# Patient Record
Sex: FEMALE | Race: WHITE | NOT HISPANIC OR LATINO | URBAN - METROPOLITAN AREA
[De-identification: names, ages, dates, MRNs, and addresses within clinical notes are randomized per-mention and may not be internally consistent; named-entity substitution may affect disease eponyms.]

---

## 2018-05-21 ENCOUNTER — OUTPATIENT (OUTPATIENT)
Dept: OUTPATIENT SERVICES | Facility: HOSPITAL | Age: 63
LOS: 1 days | Discharge: HOME | End: 2018-05-21

## 2018-05-21 DIAGNOSIS — F25.1 SCHIZOAFFECTIVE DISORDER, DEPRESSIVE TYPE: ICD-10-CM

## 2018-12-04 ENCOUNTER — OUTPATIENT (OUTPATIENT)
Dept: OUTPATIENT SERVICES | Facility: HOSPITAL | Age: 63
LOS: 1 days | Discharge: HOME | End: 2018-12-04

## 2018-12-05 DIAGNOSIS — F20.9 SCHIZOPHRENIA, UNSPECIFIED: ICD-10-CM

## 2018-12-18 ENCOUNTER — OUTPATIENT (OUTPATIENT)
Dept: OUTPATIENT SERVICES | Facility: HOSPITAL | Age: 63
LOS: 1 days | Discharge: HOME | End: 2018-12-18

## 2018-12-18 DIAGNOSIS — F20.9 SCHIZOPHRENIA, UNSPECIFIED: ICD-10-CM

## 2020-10-02 ENCOUNTER — TRANSCRIPTION ENCOUNTER (OUTPATIENT)
Age: 65
End: 2020-10-02

## 2021-10-07 ENCOUNTER — INPATIENT (INPATIENT)
Facility: HOSPITAL | Age: 66
LOS: 6 days | Discharge: NURSING, REHAB OR LONG-TERM CARE | End: 2021-10-14
Attending: HOSPITALIST | Admitting: HOSPITALIST
Payer: MEDICARE

## 2021-10-07 VITALS
OXYGEN SATURATION: 97 % | HEART RATE: 92 BPM | RESPIRATION RATE: 18 BRPM | DIASTOLIC BLOOD PRESSURE: 72 MMHG | SYSTOLIC BLOOD PRESSURE: 103 MMHG | TEMPERATURE: 99 F

## 2021-10-07 LAB
ALBUMIN SERPL ELPH-MCNC: 3.9 G/DL — SIGNIFICANT CHANGE UP (ref 3.5–5.2)
ALP SERPL-CCNC: 53 U/L — SIGNIFICANT CHANGE UP (ref 30–115)
ALT FLD-CCNC: 14 U/L — SIGNIFICANT CHANGE UP (ref 0–41)
ANION GAP SERPL CALC-SCNC: 14 MMOL/L — SIGNIFICANT CHANGE UP (ref 7–14)
AST SERPL-CCNC: 23 U/L — SIGNIFICANT CHANGE UP (ref 0–41)
BASE EXCESS BLDV CALC-SCNC: 4.5 MMOL/L — HIGH (ref -2–3)
BASOPHILS # BLD AUTO: 0.03 K/UL — SIGNIFICANT CHANGE UP (ref 0–0.2)
BASOPHILS NFR BLD AUTO: 0.3 % — SIGNIFICANT CHANGE UP (ref 0–1)
BILIRUB SERPL-MCNC: 0.2 MG/DL — SIGNIFICANT CHANGE UP (ref 0.2–1.2)
BUN SERPL-MCNC: 20 MG/DL — SIGNIFICANT CHANGE UP (ref 10–20)
CA-I SERPL-SCNC: 1.07 MMOL/L — LOW (ref 1.15–1.33)
CALCIUM SERPL-MCNC: 9.2 MG/DL — SIGNIFICANT CHANGE UP (ref 8.5–10.1)
CHLORIDE SERPL-SCNC: 98 MMOL/L — SIGNIFICANT CHANGE UP (ref 98–110)
CO2 SERPL-SCNC: 29 MMOL/L — SIGNIFICANT CHANGE UP (ref 17–32)
CREAT SERPL-MCNC: 1.4 MG/DL — SIGNIFICANT CHANGE UP (ref 0.7–1.5)
EOSINOPHIL # BLD AUTO: 0.03 K/UL — SIGNIFICANT CHANGE UP (ref 0–0.7)
EOSINOPHIL NFR BLD AUTO: 0.3 % — SIGNIFICANT CHANGE UP (ref 0–8)
GAS PNL BLDV: 133 MMOL/L — LOW (ref 136–145)
GAS PNL BLDV: SIGNIFICANT CHANGE UP
GLUCOSE SERPL-MCNC: 98 MG/DL — SIGNIFICANT CHANGE UP (ref 70–99)
HCO3 BLDV-SCNC: 31 MMOL/L — HIGH (ref 22–29)
HCT VFR BLD CALC: 37 % — SIGNIFICANT CHANGE UP (ref 37–47)
HCT VFR BLDA CALC: 48 % — HIGH (ref 34.5–46.5)
HGB BLD CALC-MCNC: 15.9 G/DL — SIGNIFICANT CHANGE UP (ref 11.7–16.1)
HGB BLD-MCNC: 12.4 G/DL — SIGNIFICANT CHANGE UP (ref 12–16)
IMM GRANULOCYTES NFR BLD AUTO: 1.3 % — HIGH (ref 0.1–0.3)
LACTATE BLDV-MCNC: 1 MMOL/L — SIGNIFICANT CHANGE UP (ref 0.5–2)
LYMPHOCYTES # BLD AUTO: 0.97 K/UL — LOW (ref 1.2–3.4)
LYMPHOCYTES # BLD AUTO: 10.6 % — LOW (ref 20.5–51.1)
MCHC RBC-ENTMCNC: 32.1 PG — HIGH (ref 27–31)
MCHC RBC-ENTMCNC: 33.5 G/DL — SIGNIFICANT CHANGE UP (ref 32–37)
MCV RBC AUTO: 95.9 FL — SIGNIFICANT CHANGE UP (ref 81–99)
MONOCYTES # BLD AUTO: 1.26 K/UL — HIGH (ref 0.1–0.6)
MONOCYTES NFR BLD AUTO: 13.7 % — HIGH (ref 1.7–9.3)
NEUTROPHILS # BLD AUTO: 6.78 K/UL — HIGH (ref 1.4–6.5)
NEUTROPHILS NFR BLD AUTO: 73.8 % — SIGNIFICANT CHANGE UP (ref 42.2–75.2)
NRBC # BLD: 0 /100 WBCS — SIGNIFICANT CHANGE UP (ref 0–0)
NT-PROBNP SERPL-SCNC: 751 PG/ML — HIGH (ref 0–300)
PCO2 BLDV: 51 MMHG — HIGH (ref 39–42)
PH BLDV: 7.39 — SIGNIFICANT CHANGE UP (ref 7.32–7.43)
PLATELET # BLD AUTO: 151 K/UL — SIGNIFICANT CHANGE UP (ref 130–400)
PO2 BLDV: 43 MMHG — SIGNIFICANT CHANGE UP
POTASSIUM BLDV-SCNC: 3.8 MMOL/L — SIGNIFICANT CHANGE UP (ref 3.5–5.1)
POTASSIUM SERPL-MCNC: 4.4 MMOL/L — SIGNIFICANT CHANGE UP (ref 3.5–5)
POTASSIUM SERPL-SCNC: 4.4 MMOL/L — SIGNIFICANT CHANGE UP (ref 3.5–5)
PROT SERPL-MCNC: 6.2 G/DL — SIGNIFICANT CHANGE UP (ref 6–8)
RAPID RVP RESULT: DETECTED
RBC # BLD: 3.86 M/UL — LOW (ref 4.2–5.4)
RBC # FLD: 14 % — SIGNIFICANT CHANGE UP (ref 11.5–14.5)
RV+EV RNA SPEC QL NAA+PROBE: DETECTED
SAO2 % BLDV: 72.2 % — SIGNIFICANT CHANGE UP
SARS-COV-2 RNA SPEC QL NAA+PROBE: SIGNIFICANT CHANGE UP
SODIUM SERPL-SCNC: 141 MMOL/L — SIGNIFICANT CHANGE UP (ref 135–146)
TROPONIN T SERPL-MCNC: 0.26 NG/ML — CRITICAL HIGH
WBC # BLD: 9.19 K/UL — SIGNIFICANT CHANGE UP (ref 4.8–10.8)
WBC # FLD AUTO: 9.19 K/UL — SIGNIFICANT CHANGE UP (ref 4.8–10.8)

## 2021-10-07 PROCEDURE — 99285 EMERGENCY DEPT VISIT HI MDM: CPT

## 2021-10-07 PROCEDURE — 71275 CT ANGIOGRAPHY CHEST: CPT | Mod: 26,MA

## 2021-10-07 PROCEDURE — 71045 X-RAY EXAM CHEST 1 VIEW: CPT | Mod: 26

## 2021-10-07 PROCEDURE — 93010 ELECTROCARDIOGRAM REPORT: CPT

## 2021-10-07 PROCEDURE — 93010 ELECTROCARDIOGRAM REPORT: CPT | Mod: 77

## 2021-10-07 RX ORDER — ASPIRIN/CALCIUM CARB/MAGNESIUM 324 MG
325 TABLET ORAL ONCE
Refills: 0 | Status: COMPLETED | OUTPATIENT
Start: 2021-10-07 | End: 2021-10-07

## 2021-10-07 RX ORDER — IPRATROPIUM/ALBUTEROL SULFATE 18-103MCG
3 AEROSOL WITH ADAPTER (GRAM) INHALATION ONCE
Refills: 0 | Status: COMPLETED | OUTPATIENT
Start: 2021-10-07 | End: 2021-10-07

## 2021-10-07 RX ORDER — SODIUM CHLORIDE 9 MG/ML
1000 INJECTION INTRAMUSCULAR; INTRAVENOUS; SUBCUTANEOUS ONCE
Refills: 0 | Status: COMPLETED | OUTPATIENT
Start: 2021-10-07 | End: 2021-10-07

## 2021-10-07 RX ORDER — ACETAMINOPHEN 500 MG
650 TABLET ORAL ONCE
Refills: 0 | Status: COMPLETED | OUTPATIENT
Start: 2021-10-07 | End: 2021-10-07

## 2021-10-07 RX ORDER — IPRATROPIUM/ALBUTEROL SULFATE 18-103MCG
3 AEROSOL WITH ADAPTER (GRAM) INHALATION EVERY 6 HOURS
Refills: 0 | Status: DISCONTINUED | OUTPATIENT
Start: 2021-10-07 | End: 2021-10-07

## 2021-10-07 RX ORDER — ALBUTEROL 90 UG/1
2 AEROSOL, METERED ORAL ONCE
Refills: 0 | Status: COMPLETED | OUTPATIENT
Start: 2021-10-07 | End: 2021-10-07

## 2021-10-07 RX ORDER — IPRATROPIUM/ALBUTEROL SULFATE 18-103MCG
3 AEROSOL WITH ADAPTER (GRAM) INHALATION
Refills: 0 | Status: DISCONTINUED | OUTPATIENT
Start: 2021-10-07 | End: 2021-10-07

## 2021-10-07 RX ADMIN — Medication 125 MILLIGRAM(S): at 17:46

## 2021-10-07 RX ADMIN — SODIUM CHLORIDE 1000 MILLILITER(S): 9 INJECTION INTRAMUSCULAR; INTRAVENOUS; SUBCUTANEOUS at 18:26

## 2021-10-07 RX ADMIN — Medication 325 MILLIGRAM(S): at 20:37

## 2021-10-07 RX ADMIN — Medication 3 MILLILITER(S): at 18:10

## 2021-10-07 RX ADMIN — ALBUTEROL 2 PUFF(S): 90 AEROSOL, METERED ORAL at 17:56

## 2021-10-07 RX ADMIN — SODIUM CHLORIDE 1000 MILLILITER(S): 9 INJECTION INTRAMUSCULAR; INTRAVENOUS; SUBCUTANEOUS at 18:29

## 2021-10-07 RX ADMIN — Medication 3 MILLILITER(S): at 18:12

## 2021-10-07 RX ADMIN — Medication 3 MILLILITER(S): at 18:26

## 2021-10-07 RX ADMIN — Medication 650 MILLIGRAM(S): at 18:23

## 2021-10-07 NOTE — ED PROVIDER NOTE - ATTENDING CONTRIBUTION TO CARE
67 yo f with pmh of schizoaffective disorder, depression, copd sent from a residential services ([facility]) for evaluation of SOB.  pt admits has been having coughing, sob and wheezing for a few days, worse today.  +sob, no cp.  exam: nad, ncat, perrl, eomi, mmm, rrr, wheezing and decreased air exchange diffusely, abd soft, nt,nd aox3, imp: pt with copd, here with uri sx, wheezing, likely exacerbation, will check labs, nebs, steroids,

## 2021-10-07 NOTE — ED PROVIDER NOTE - CLINICAL SUMMARY MEDICAL DECISION MAKING FREE TEXT BOX
66 yr old f that presents with sob. labs, ekg, imaging obtained. pt noted to have elevated troponin. evaluated by cardiology, for NSTEMI. pt admitted to medicine for further evaluation.

## 2021-10-07 NOTE — CONSULT NOTE ADULT - SUBJECTIVE AND OBJECTIVE BOX
HPI:  65yo female patient with PMHx of HTN, DL, hypothyroidism, COPD, schizophrenia, sent from assisted living because of worsening shortness of breath.  Patient says she started feeling worse over the last few days, now short of breath at rest, associated with cough, she feels she has sputum but unable to cough it up.  No chest pain, no palpitations, no orthopnea, no PND, no COLLETTE.    PAST MEDICAL & SURGICAL HISTORY  HTN  DL  Hypothyroidism  COPD  Schizophrenia    FAMILY HISTORY:  Negative for CAD      SOCIAL HISTORY:  [x]smoker  []Alcohol  []Drug    ALLERGIES:  Dilantin (Unknown)  Haldol (Unknown)    HOME MEDICATIONS:  statin  lisinopril  Inhalers  schizophrenia meds      VITALS:   T(F): 100.3 (10-07 @ 17:55), Max: 100.3 (10-07 @ 17:55)  HR: 92 (10-07 @ 16:24) (92 - 92)  BP: 103/72 (10-07 @ 16:24) (103/72 - 103/72)  BP(mean): --  RR: 18 (10-07 @ 16:24) (18 - 18)  SpO2: 97% (10-07 @ 16:24) (97% - 97%)    I&O's Summary      REVIEW OF SYSTEMS:  CONSTITUTIONAL: No weakness, fevers or chills  EYES: No visual changes  ENT: No vertigo or throat pain   NECK: No pain or stiffness  RESPIRATORY: (+) cough, (+) wheezing,no hemoptysis; (+) shortness of breath  CARDIOVASCULAR: No chest pain or palpitations  GASTROINTESTINAL: No abdominal or epigastric pain. No nausea, vomiting, or hematemesis; No diarrhea or constipation. No melena or hematochezia.  GENITOURINARY: No dysuria, frequency or hematuria  NEUROLOGICAL: No numbness or weakness  SKIN: No itching, no rashes  MSK: No pain    PHYSICAL EXAM:  NEURO: patient is awake , alert and oriented  GEN: Not in acute distress  NECK: no thyroid enlargement, no JVD  LUNGS: Bilateral ronchi and wheezing, decreased air entry  CARDIOVASCULAR: S1/S2 present, RRR , no murmurs or rubs, no carotid bruits,  + PP bilaterally  ABD: Soft, non-tender, non-distended, +BS  EXT: No COLLETTE    LABS:                        12.4   9.19  )-----------( 151      ( 07 Oct 2021 17:54 )             37.0     10-07    141  |  98  |  20  ----------------------------<  98  4.4   |  29  |  1.4    Ca    9.2      07 Oct 2021 17:54    TPro  6.2  /  Alb  3.9  /  TBili  0.2  /  DBili  x   /  AST  23  /  ALT  14  /  AlkPhos  53  10-07      Troponin T, Serum: 0.26 ng/mL *HH* (10-07-21 @ 17:54)    CARDIAC MARKERS ( 07 Oct 2021 17:54 )  x     / 0.26 ng/mL / x     / x     / x            Troponin trend:    Serum Pro-Brain Natriuretic Peptide: 751 pg/mL (10-07-21 @ 17:54)    RADIOLOGY:  -CXR: No acute pathology    -TTE: none on chart      ECG:  sinus at 80bpm, incomplete RBBB   HPI:    67yo female patient with PMHx of HTN, DL, hypothyroidism, COPD, schizophrenia, sent from assisted living because of worsening shortness of breath.  Patient says she started feeling worse over the last few days, now short of breath at rest, associated with cough, she feels she has sputum but unable to cough it up.  No chest pain, no palpitations, no orthopnea, no PND, no COLLETTE.    PAST MEDICAL & SURGICAL HISTORY  HTN  DL  Hypothyroidism  COPD  Schizophrenia    FAMILY HISTORY:  Negative for CAD      SOCIAL HISTORY:  [x]smoker  []Alcohol  []Drug    ALLERGIES:  Dilantin (Unknown)  Haldol (Unknown)    HOME MEDICATIONS:  statin  lisinopril  Inhalers  schizophrenia meds      VITALS:   T(F): 100.3 (10-07 @ 17:55), Max: 100.3 (10-07 @ 17:55)  HR: 92 (10-07 @ 16:24) (92 - 92)  BP: 103/72 (10-07 @ 16:24) (103/72 - 103/72)  BP(mean): --  RR: 18 (10-07 @ 16:24) (18 - 18)  SpO2: 97% (10-07 @ 16:24) (97% - 97%)    I&O's Summary      REVIEW OF SYSTEMS:  CONSTITUTIONAL: No weakness, fevers or chills  EYES: No visual changes  ENT: No vertigo or throat pain   NECK: No pain or stiffness  RESPIRATORY: (+) cough, (+) wheezing,no hemoptysis; (+) shortness of breath  CARDIOVASCULAR: No chest pain or palpitations  GASTROINTESTINAL: No abdominal or epigastric pain. No nausea, vomiting, or hematemesis; No diarrhea or constipation. No melena or hematochezia.  GENITOURINARY: No dysuria, frequency or hematuria  NEUROLOGICAL: No numbness or weakness  SKIN: No itching, no rashes  MSK: No pain    PHYSICAL EXAM:  NEURO: patient is awake , alert and oriented  GEN: Not in acute distress  NECK: no thyroid enlargement, no JVD  LUNGS: Bilateral ronchi and wheezing, decreased air entry  CARDIOVASCULAR: S1/S2 present, RRR , no murmurs or rubs, no carotid bruits,  + PP bilaterally  ABD: Soft, non-tender, non-distended, +BS  EXT: No COLLETTE    LABS:                        12.4   9.19  )-----------( 151      ( 07 Oct 2021 17:54 )             37.0     10-07    141  |  98  |  20  ----------------------------<  98  4.4   |  29  |  1.4    Ca    9.2      07 Oct 2021 17:54    TPro  6.2  /  Alb  3.9  /  TBili  0.2  /  DBili  x   /  AST  23  /  ALT  14  /  AlkPhos  53  10-07      Troponin T, Serum: 0.26 ng/mL *HH* (10-07-21 @ 17:54)    CARDIAC MARKERS ( 07 Oct 2021 17:54 )  x     / 0.26 ng/mL / x     / x     / x            Troponin trend:    Serum Pro-Brain Natriuretic Peptide: 751 pg/mL (10-07-21 @ 17:54)    RADIOLOGY:  -CXR: No acute pathology    -TTE: none on chart      ECG:  sinus at 80bpm, incomplete RBBB

## 2021-10-07 NOTE — CONSULT NOTE ADULT - ASSESSMENT
IMPRESSION:  - Elevated cardiac troponins and BNP in context of COPD exacerbation and Upper respiratory tract infection - likely type II NSTEMI (demand ischemia) or myocarditis picture; ACS is unlikely    PLAN:  - Telemetry  - Loading with aspirin 325mg then 81mg daily  - Continue BP meds and statin therapy  - No need for DAPT or therapeutic anticoagulation for now  - Repeat cardiac enzymes (include CK and CKMB) and repeat ECG tonight and in AM  - Check 2d echo  - Check lipid panel, Hba1c, TSH  - Treat COPD exacerbation, consider pulmonary evaluation  - Will follow IMPRESSION:  - Elevated cardiac troponins and BNP in context of COPD exacerbation and Upper respiratory tract infection - likely type II NSTEMI (demand ischemia), RV strain from pulmonary disease or myocarditis picture; ACS is unlikely    PLAN:  - Telemetry  - Loading with aspirin 325mg then 81mg daily  - Continue BP meds and statin therapy  - No need for DAPT or therapeutic anticoagulation for now  - Repeat cardiac enzymes (include CK and CKMB) and repeat ECG tonight and in AM  - Check 2d echo  - Check lipid panel, Hba1c, TSH  - Treat COPD exacerbation, consider pulmonary evaluation  - Will follow IMPRESSION:  - Elevated cardiac troponins and BNP in context of COPD exacerbation and Upper respiratory tract infection - c/w type II NSTEMI (demand ischemia), r/o RV strain from pulmonary disease or myocarditis picture; ACS is unlikely    PLAN:  - Telemetry  - Loading with aspirin 325mg then 81mg daily  - Continue BP meds and statin therapy  - No need for DAPT or therapeutic anticoagulation for now  - Repeat cardiac enzymes (include CK and CKMB) and repeat ECG tonight and in AM  - Check 2d echo  - Check lipid panel, Hba1c, TSH  - Management of viral infection as per primary team.  - Treat COPD exacerbation, consider pulmonary evaluation   IMPRESSION:  - Elevated cardiac troponins and BNP in context of COPD exacerbation and Upper respiratory tract infection - c/w type II NSTEMI (demand ischemia), r/o RV strain from pulmonary disease or myocarditis picture; ACS is unlikely    PLAN:  - Telemetry  - Loading with aspirin 325mg then 81mg daily  - Continue BP meds and statin therapy  - No need for DAPT or therapeutic anticoagulation for now  - Repeat cardiac enzymes (include CK and CKMB) and repeat ECG tonight and in AM  - Check 2d echo  - Check lipid panel, Hba1c, TSH  - Management of viral infection as per primary team.  - Treat COPD exacerbation, consider pulmonary evaluation  - There was a concern for complete heart block on ECG this AM. I have reviewed the ECG and telemetry - there is no evidence of any heart block. Normal AV conduction. No further intervention is needed for this (no need for EP consult).

## 2021-10-07 NOTE — ED PROVIDER NOTE - PHYSICAL EXAMINATION
CONSTITUTIONAL: Well-appearing; well-nourished; in no apparent distress.   CARDIOVASCULAR: Normal S1, S2; no murmurs, rubs, or gallops.   RESP: diffuse wheezing; Normal chest excursion with respiration  GI/: Normal bowel sounds; non-distended; non-tender; no palpable organomegaly.   MS: Normal ROM in all four extremities; non-tender to palpation; distal pulses are normal.   NEURO/PSYCH: A & O x 4; grossly unremarkable. mood and manner are appropriate.

## 2021-10-07 NOTE — ED ADULT NURSE NOTE - OBJECTIVE STATEMENT
A&Ox3. sitting up in stretcher  presents to ED with increasing SOB   pt on nasal cannula  no s/s of distress  awaiting further MD eval and dispo

## 2021-10-07 NOTE — ED PROVIDER NOTE - NS ED ROS FT
Constitutional: no fever, chills, no recent weight loss, change in appetite or malaise  Eyes: no redness/discharge/pain/vision changes  ENT: no rhinorrhea/ear pain/sore throat  Cardiac: No chest pain, edema.  Respiratory: No cough or respiratory distress  GI: No nausea, vomiting, diarrhea or abdominal pain.  : No dysuria, frequency, urgency or hematuria  MS: no pain to back or extremities, no loss of ROM, no weakness  Neuro: No headache or weakness. No LOC.  Skin: No skin rash.  Endocrine: No history of thyroid disease or diabetes.  Except as documented in the HPI, all other systems are negative.

## 2021-10-07 NOTE — ED PROVIDER NOTE - OBJECTIVE STATEMENT
pt with pmhx schizoaffective disorder, depression, COPD sent from a residential services ([facility]) for evaluation of SOB. pt admits has been having coughing, sob and wheezing for a few days, worse today. Denies fever/chill/HA/dizziness/chest pain/palpitation/abd pain/n/v/d/ black stool/bloody stool/urinary sxs

## 2021-10-07 NOTE — ED PROVIDER NOTE - CARE PLAN
1 Principal Discharge DX:	NSTEMI (non-ST elevation myocardial infarction)  Secondary Diagnosis:	Rhinovirus  Secondary Diagnosis:	COPD exacerbation

## 2021-10-08 LAB
A1C WITH ESTIMATED AVERAGE GLUCOSE RESULT: 5 % — SIGNIFICANT CHANGE UP (ref 4–5.6)
A1C WITH ESTIMATED AVERAGE GLUCOSE RESULT: 5.2 % — SIGNIFICANT CHANGE UP (ref 4–5.6)
ALBUMIN SERPL ELPH-MCNC: 3.5 G/DL — SIGNIFICANT CHANGE UP (ref 3.5–5.2)
ALP SERPL-CCNC: 44 U/L — SIGNIFICANT CHANGE UP (ref 30–115)
ALT FLD-CCNC: 11 U/L — SIGNIFICANT CHANGE UP (ref 0–41)
ANION GAP SERPL CALC-SCNC: 13 MMOL/L — SIGNIFICANT CHANGE UP (ref 7–14)
AST SERPL-CCNC: 24 U/L — SIGNIFICANT CHANGE UP (ref 0–41)
BILIRUB SERPL-MCNC: <0.2 MG/DL — SIGNIFICANT CHANGE UP (ref 0.2–1.2)
BUN SERPL-MCNC: 17 MG/DL — SIGNIFICANT CHANGE UP (ref 10–20)
CALCIUM SERPL-MCNC: 8.3 MG/DL — LOW (ref 8.5–10.1)
CHLORIDE SERPL-SCNC: 103 MMOL/L — SIGNIFICANT CHANGE UP (ref 98–110)
CHOLEST SERPL-MCNC: 139 MG/DL — SIGNIFICANT CHANGE UP
CHOLEST SERPL-MCNC: 142 MG/DL — SIGNIFICANT CHANGE UP
CK MB CFR SERPL CALC: 11.9 NG/ML — HIGH (ref 0.6–6.3)
CK MB CFR SERPL CALC: 12.2 NG/ML — HIGH (ref 0.6–6.3)
CK SERPL-CCNC: 473 U/L — HIGH (ref 0–225)
CK SERPL-CCNC: 502 U/L — HIGH (ref 0–225)
CO2 SERPL-SCNC: 24 MMOL/L — SIGNIFICANT CHANGE UP (ref 17–32)
CREAT SERPL-MCNC: 0.9 MG/DL — SIGNIFICANT CHANGE UP (ref 0.7–1.5)
ESTIMATED AVERAGE GLUCOSE: 103 MG/DL — SIGNIFICANT CHANGE UP (ref 68–114)
ESTIMATED AVERAGE GLUCOSE: 97 MG/DL — SIGNIFICANT CHANGE UP (ref 68–114)
GLUCOSE SERPL-MCNC: 118 MG/DL — HIGH (ref 70–99)
HCT VFR BLD CALC: 35.4 % — LOW (ref 37–47)
HCV AB S/CO SERPL IA: 0.04 COI — SIGNIFICANT CHANGE UP
HCV AB SERPL-IMP: SIGNIFICANT CHANGE UP
HDLC SERPL-MCNC: 54 MG/DL — SIGNIFICANT CHANGE UP
HDLC SERPL-MCNC: 57 MG/DL — SIGNIFICANT CHANGE UP
HGB BLD-MCNC: 11.5 G/DL — LOW (ref 12–16)
LACTATE SERPL-SCNC: 1.8 MMOL/L — SIGNIFICANT CHANGE UP (ref 0.7–2)
LIPID PNL WITH DIRECT LDL SERPL: 55 MG/DL — SIGNIFICANT CHANGE UP
LIPID PNL WITH DIRECT LDL SERPL: 58 MG/DL — SIGNIFICANT CHANGE UP
MAGNESIUM SERPL-MCNC: 2 MG/DL — SIGNIFICANT CHANGE UP (ref 1.8–2.4)
MCHC RBC-ENTMCNC: 31.6 PG — HIGH (ref 27–31)
MCHC RBC-ENTMCNC: 32.5 G/DL — SIGNIFICANT CHANGE UP (ref 32–37)
MCV RBC AUTO: 97.3 FL — SIGNIFICANT CHANGE UP (ref 81–99)
NON HDL CHOLESTEROL: 85 MG/DL — SIGNIFICANT CHANGE UP
NON HDL CHOLESTEROL: 85 MG/DL — SIGNIFICANT CHANGE UP
NRBC # BLD: 0 /100 WBCS — SIGNIFICANT CHANGE UP (ref 0–0)
PLATELET # BLD AUTO: 138 K/UL — SIGNIFICANT CHANGE UP (ref 130–400)
POTASSIUM SERPL-MCNC: 4.2 MMOL/L — SIGNIFICANT CHANGE UP (ref 3.5–5)
POTASSIUM SERPL-SCNC: 4.2 MMOL/L — SIGNIFICANT CHANGE UP (ref 3.5–5)
PROT SERPL-MCNC: 5.5 G/DL — LOW (ref 6–8)
RBC # BLD: 3.64 M/UL — LOW (ref 4.2–5.4)
RBC # FLD: 13.9 % — SIGNIFICANT CHANGE UP (ref 11.5–14.5)
SODIUM SERPL-SCNC: 140 MMOL/L — SIGNIFICANT CHANGE UP (ref 135–146)
T3 SERPL-MCNC: 63 NG/DL — LOW (ref 80–200)
T3 SERPL-MCNC: 72 NG/DL — LOW (ref 80–200)
T4 AB SER-ACNC: 5.7 UG/DL — SIGNIFICANT CHANGE UP (ref 4.6–12)
T4 AB SER-ACNC: 6.5 UG/DL — SIGNIFICANT CHANGE UP (ref 4.6–12)
TRIGL SERPL-MCNC: 69 MG/DL — SIGNIFICANT CHANGE UP
TRIGL SERPL-MCNC: 79 MG/DL — SIGNIFICANT CHANGE UP
TROPONIN T SERPL-MCNC: 0.15 NG/ML — CRITICAL HIGH
TROPONIN T SERPL-MCNC: 0.2 NG/ML — CRITICAL HIGH
TSH SERPL-MCNC: 1.3 UIU/ML — SIGNIFICANT CHANGE UP (ref 0.27–4.2)
TSH SERPL-MCNC: 1.34 UIU/ML — SIGNIFICANT CHANGE UP (ref 0.27–4.2)
TSH SERPL-MCNC: 1.54 UIU/ML — SIGNIFICANT CHANGE UP (ref 0.27–4.2)
WBC # BLD: 5.24 K/UL — SIGNIFICANT CHANGE UP (ref 4.8–10.8)
WBC # FLD AUTO: 5.24 K/UL — SIGNIFICANT CHANGE UP (ref 4.8–10.8)

## 2021-10-08 PROCEDURE — 99223 1ST HOSP IP/OBS HIGH 75: CPT

## 2021-10-08 PROCEDURE — 99222 1ST HOSP IP/OBS MODERATE 55: CPT

## 2021-10-08 PROCEDURE — 93010 ELECTROCARDIOGRAM REPORT: CPT

## 2021-10-08 RX ORDER — MAGNESIUM SULFATE 500 MG/ML
2 VIAL (ML) INJECTION EVERY 4 HOURS
Refills: 0 | Status: COMPLETED | OUTPATIENT
Start: 2021-10-08 | End: 2021-10-08

## 2021-10-08 RX ORDER — ASPIRIN/CALCIUM CARB/MAGNESIUM 324 MG
81 TABLET ORAL DAILY
Refills: 0 | Status: DISCONTINUED | OUTPATIENT
Start: 2021-10-08 | End: 2021-10-14

## 2021-10-08 RX ORDER — ATORVASTATIN CALCIUM 80 MG/1
40 TABLET, FILM COATED ORAL AT BEDTIME
Refills: 0 | Status: DISCONTINUED | OUTPATIENT
Start: 2021-10-08 | End: 2021-10-14

## 2021-10-08 RX ORDER — LURASIDONE HYDROCHLORIDE 40 MG/1
80 TABLET ORAL DAILY
Refills: 0 | Status: DISCONTINUED | OUTPATIENT
Start: 2021-10-08 | End: 2021-10-09

## 2021-10-08 RX ORDER — LEVOTHYROXINE SODIUM 125 MCG
88 TABLET ORAL DAILY
Refills: 0 | Status: DISCONTINUED | OUTPATIENT
Start: 2021-10-08 | End: 2021-10-14

## 2021-10-08 RX ORDER — FUROSEMIDE 40 MG
20 TABLET ORAL DAILY
Refills: 0 | Status: DISCONTINUED | OUTPATIENT
Start: 2021-10-08 | End: 2021-10-14

## 2021-10-08 RX ORDER — BENZTROPINE MESYLATE 1 MG
2 TABLET ORAL DAILY
Refills: 0 | Status: DISCONTINUED | OUTPATIENT
Start: 2021-10-08 | End: 2021-10-09

## 2021-10-08 RX ORDER — IPRATROPIUM/ALBUTEROL SULFATE 18-103MCG
3 AEROSOL WITH ADAPTER (GRAM) INHALATION EVERY 4 HOURS
Refills: 0 | Status: DISCONTINUED | OUTPATIENT
Start: 2021-10-08 | End: 2021-10-14

## 2021-10-08 RX ORDER — HYDROCHLOROTHIAZIDE 25 MG
25 TABLET ORAL DAILY
Refills: 0 | Status: DISCONTINUED | OUTPATIENT
Start: 2021-10-08 | End: 2021-10-14

## 2021-10-08 RX ORDER — AMLODIPINE BESYLATE 2.5 MG/1
5 TABLET ORAL DAILY
Refills: 0 | Status: DISCONTINUED | OUTPATIENT
Start: 2021-10-08 | End: 2021-10-14

## 2021-10-08 RX ORDER — LISINOPRIL 2.5 MG/1
40 TABLET ORAL DAILY
Refills: 0 | Status: DISCONTINUED | OUTPATIENT
Start: 2021-10-08 | End: 2021-10-14

## 2021-10-08 RX ORDER — BUDESONIDE AND FORMOTEROL FUMARATE DIHYDRATE 160; 4.5 UG/1; UG/1
2 AEROSOL RESPIRATORY (INHALATION)
Refills: 0 | Status: DISCONTINUED | OUTPATIENT
Start: 2021-10-08 | End: 2021-10-14

## 2021-10-08 RX ORDER — HEPARIN SODIUM 5000 [USP'U]/ML
5000 INJECTION INTRAVENOUS; SUBCUTANEOUS EVERY 8 HOURS
Refills: 0 | Status: DISCONTINUED | OUTPATIENT
Start: 2021-10-08 | End: 2021-10-14

## 2021-10-08 RX ORDER — PANTOPRAZOLE SODIUM 20 MG/1
40 TABLET, DELAYED RELEASE ORAL AT BEDTIME
Refills: 0 | Status: DISCONTINUED | OUTPATIENT
Start: 2021-10-08 | End: 2021-10-14

## 2021-10-08 RX ORDER — HYDROXYZINE HCL 10 MG
10 TABLET ORAL DAILY
Refills: 0 | Status: DISCONTINUED | OUTPATIENT
Start: 2021-10-08 | End: 2021-10-09

## 2021-10-08 RX ORDER — FLUPHENAZINE HYDROCHLORIDE 1 MG/1
37.5 TABLET, FILM COATED ORAL ONCE
Refills: 0 | Status: COMPLETED | OUTPATIENT
Start: 2021-10-08 | End: 2021-10-08

## 2021-10-08 RX ADMIN — Medication 16.67 GRAM(S): at 16:31

## 2021-10-08 RX ADMIN — Medication 20 MILLIGRAM(S): at 06:38

## 2021-10-08 RX ADMIN — Medication 16.67 GRAM(S): at 11:48

## 2021-10-08 RX ADMIN — FLUPHENAZINE HYDROCHLORIDE 37.5 MILLIGRAM(S): 1 TABLET, FILM COATED ORAL at 21:41

## 2021-10-08 RX ADMIN — BUDESONIDE AND FORMOTEROL FUMARATE DIHYDRATE 2 PUFF(S): 160; 4.5 AEROSOL RESPIRATORY (INHALATION) at 21:41

## 2021-10-08 RX ADMIN — HEPARIN SODIUM 5000 UNIT(S): 5000 INJECTION INTRAVENOUS; SUBCUTANEOUS at 21:40

## 2021-10-08 RX ADMIN — Medication 25 MILLIGRAM(S): at 06:38

## 2021-10-08 RX ADMIN — LISINOPRIL 40 MILLIGRAM(S): 2.5 TABLET ORAL at 06:38

## 2021-10-08 RX ADMIN — AMLODIPINE BESYLATE 5 MILLIGRAM(S): 2.5 TABLET ORAL at 06:38

## 2021-10-08 RX ADMIN — PANTOPRAZOLE SODIUM 40 MILLIGRAM(S): 20 TABLET, DELAYED RELEASE ORAL at 21:40

## 2021-10-08 RX ADMIN — Medication 3 MILLILITER(S): at 15:50

## 2021-10-08 RX ADMIN — Medication 88 MICROGRAM(S): at 06:38

## 2021-10-08 RX ADMIN — Medication 3 MILLILITER(S): at 03:19

## 2021-10-08 RX ADMIN — ATORVASTATIN CALCIUM 40 MILLIGRAM(S): 80 TABLET, FILM COATED ORAL at 21:41

## 2021-10-08 RX ADMIN — HEPARIN SODIUM 5000 UNIT(S): 5000 INJECTION INTRAVENOUS; SUBCUTANEOUS at 06:38

## 2021-10-08 NOTE — H&P ADULT - ASSESSMENT
65yo female patient with PMHx of HTN, DL, hypothyroidism, COPD, schizophrenia, sent from assisted living because of worsening shortness of breath. Patient says she started feeling worse over the last few days, now short of breath at rest, associated with cough, she feels she has sputum but unable to cough it up. No chest pain, no palpitations, no orthopnea, no PND, no COLLETTE      #SOB and elevated troponin likely due to type II NSTEMI  - DDX: demand ischemia due to URI resulting COPD exacerbation vs myocarditis  - on admission: wbc nl, [id number], troponin 0.26 likely due to demand ischemia  - EKG: sinus at 80bpm, incomplete RBBB  - no TTE on file   - seen by cardiology in the ED   - Loaded w/aspirin 325mg then 81mg daily. No need for DAPT or therapeutic anticoagulation for now  - c/w statin and Bp meds  - f/u trops and CK-Mb  - Repeat ECG tonight and in AM  - Check 2d echo  - f/u lipid panel, Hba1c, TSH  - admit to telemetry        #COPD exacerbation 2/2 URI   - On admission: WBC nl, RVP +   - c/w DuoNeb q6 PRN  - c/w Symbicort   - supplemental O2 as needed     #DLD  - c/w statin    #HTN  - c/w lisinopril and amlodipine       #hypothyroidism   - c/w home dose levothyroxine      #Schizophrenia   - c/w meds       #Misc  - DVT Prophylaxis: heparin 5000U subq q8h   - Diet: regular  - GI Prophylaxis: protonix  - Activity: as tolerated   - IV Fluids: n/a  - Code Status: full code           67yo female patient with PMHx of HTN, DL, hypothyroidism, COPD, schizophrenia, sent from assisted living because of worsening shortness of breath. Patient says she started feeling worse over the last few days, now short of breath at rest, associated with cough, she feels she has sputum but unable to cough it up. No chest pain, no palpitations, no orthopnea, no PND, no COLLETTE      #SOB and elevated troponin likely due to type II NSTEMI in a setting of COPD exacerbation and URI   - DDX: demand ischemia due to URI resulting COPD exacerbation vs myocarditis  - on admission: wbc nl, , troponin 0.26 likely due to demand ischemia  - EKG: sinus at 80bpm, incomplete RBBB  - f/u CXR  - no TTE on file   - seen by cardiology in the ED   - Loaded w/aspirin 325mg then 81mg daily. No need for DAPT or therapeutic anticoagulation for now  - c/w statin and Bp meds  - f/u trops and CK-Mb  - Repeat ECG tonight and in AM  - Check 2d echo  - f/u lipid panel, Hba1c, TSH  - admit to telemetry        #COPD exacerbation 2/2 URI   - On admission: WBC nl, RVP +, +wheezing-->improved w/ neb tx  - c/w DuoNeb q6 PRN  - c/w Symbicort   - supplemental O2 as needed     #DLD  - c/w statin    #HTN  - c/w lisinopril and amlodipine       #hypothyroidism   - c/w home dose levothyroxine      #Schizophrenia   - c/w meds       #Misc  - DVT Prophylaxis: heparin 5000U subq q8h   - Diet: regular  - GI Prophylaxis: protonix  - Activity: as tolerated   - IV Fluids: n/a  - Code Status: full code

## 2021-10-08 NOTE — H&P ADULT - HISTORY OF PRESENT ILLNESS
Patient is a 65 yo F w/ PMHx of HTN, DLD, hypothyroidism, COPD and schizophrenia was sent from assisted living for worsening shortness of breath. She also complains of cough w/ sputum. Denies fever/chills, CP, palpitations, orthopnea, PND or lower extremity edema.  Patient is a 67 yo F w/ PMHx of HTN, DLD, hypothyroidism, COPD and schizophrenia was sent from assisted living for worsening shortness of breath. Patient has been experiencing weakness and fatigue for the past couple of days, also notes having cough w/ productive sputum. Denies fever/chills, HA, CP, palpitations, orthopnea, PND or lower extremity edema, recent sick contacts. On admission VS stable, WBC nl, Trop 0.26,  COVID was negative, RVP + for rhinovirus.

## 2021-10-08 NOTE — H&P ADULT - ATTENDING COMMENTS
HPI:  Patient is a 67 yo F w/ PMHx of HTN, DLD, hypothyroidism, COPD and schizophrenia was sent from assisted living for worsening shortness of breath. Patient has been experiencing weakness and fatigue for the past couple of days, also notes having cough w/ productive sputum. Denies fever/chills, HA, CP, palpitations, orthopnea, PND or lower extremity edema, recent sick contacts. On admission VS stable, WBC nl, Trop 0.26,  COVID was negative, RVP + for rhinovirus.  (08 Oct 2021 02:28)    REVIEW OF SYSTEMS: see cc/HPI  CONSTITUTIONAL: No weakness, fevers or chills  EYES/ENT: No visual changes;  No vertigo or throat pain   NECK: No pain or stiffness  RESPIRATORY: No cough, wheezing, hemoptysis; No shortness of breath  CARDIOVASCULAR: No chest pain or palpitations  GASTROINTESTINAL: No abdominal or epigastric pain. No nausea, vomiting, or hematemesis; No diarrhea or constipation. No melena or hematochezia.  GENITOURINARY: No dysuria, frequency or hematuria  NEUROLOGICAL: No numbness or weakness  SKIN: No itching, rashes    Physical Exam:   General: WN/WD NAD  Neurology: A&Ox3, nonfocal, follows commands  Eyes: PERRLA/ EOMI  ENT/Neck: Neck supple, trachea midline, No JVD  Respiratory: CTA B/L, No wheezing, rales, rhonchi  CV: Normal rate regular rhythm, S1S2, no murmurs, rubs or gallops  Abdominal: Soft, NT, ND +BS,   Extremities: No edema, + peripheral pulses  Skin: No Rashes, Hematoma, Ecchymosis  Incisions: n/a  Tubes: n/a HPI:  Patient is a 67 yo F w/ PMHx of HTN, DLD, hypothyroidism, COPD and schizophrenia was sent from assisted living for worsening shortness of breath. Patient has been experiencing weakness and fatigue for the past couple of days, also notes having cough w/ productive sputum. Denies fever/chills, HA, CP, palpitations, orthopnea, PND or lower extremity edema, recent sick contacts. On admission VS stable, WBC nl, Trop 0.26,  COVID was negative, RVP + for rhinovirus.  (08 Oct 2021 02:28)    REVIEW OF SYSTEMS: see cc/HPI  CONSTITUTIONAL: No weakness, fevers or chills  EYES/ENT: No visual changes;  No vertigo or throat pain   NECK: No pain or stiffness  RESPIRATORY: (+) cough, (+) wheezing, NO hemoptysis; (+) shortness of breath  CARDIOVASCULAR: No chest pain or palpitations  GASTROINTESTINAL: No abdominal or epigastric pain. No nausea, vomiting, or hematemesis; No diarrhea or constipation. No melena or hematochezia.  GENITOURINARY: No dysuria, frequency or hematuria  NEUROLOGICAL: No numbness or weakness  SKIN: No itching, rashes    Physical Exam:   General: WN/WD NAD  Neurology: A&Ox3, nonfocal, follows commands  Eyes: PERRLA/ EOMI  ENT/Neck: Neck supple, trachea midline, No JVD  Respiratory: B/L wheezing, NO rales, rhonchi  CV: Normal rate regular rhythm, S1S2, no murmurs, rubs or gallops  Abdominal: Soft, NT, ND +BS,   Extremities: No edema, + peripheral pulses  Skin: No Rashes, Hematoma, Ecchymosis  Incisions: n/a  Tubes: n/a    A/p  Dyspnea / COPD exacerbation   Rhinovirus infection / URI   -IV steroids / Albuterol   -pulse ox monitoring / O2 via NC    NSTEMI / elevated trop -most likely related to demand ischemia   -admit to tele   -serial CE and EKG   -2D echo   -Cardiology eval   -ASA /BBlocker/ statin   -fasting lipids, HgA1c, TSH     Dyslipidemia -statin / Hypothyroidism - levothyroxine     HTN - ACEI and amlodipine     Schizophrenia   c/w home Rx    DVT prophylaxis

## 2021-10-08 NOTE — CHART NOTE - NSCHARTNOTEFT_GEN_A_CORE
Pt seen and examined at the bedside during AM rounds w/Attending. Diffusely wheezing on exam, DuoNebs, Prednisone 40mg PO QD x 5 days, Mg IVPB 2g x 2 ordered. Pt refused 2D echo on the grounds that she :just had an EKG" despite explanations of the differences in these exams pt would not permit transport to take her to echo.

## 2021-10-08 NOTE — H&P ADULT - NSHPPHYSICALEXAM_GEN_ALL_CORE
CONSTITUTIONAL: No acute distress, well-developed, well-groomed, AAOx3  HEAD: Atraumatic, normocephalic  EYES: EOM intact, PERRLA, conjunctiva and sclera clear  ENT: Supple, no masses, no thyromegaly, no bruits, no JVD; moist mucous membranes  PULMONARY: Clear to auscultation bilaterally; no wheezes, rales, or rhonchi  CARDIOVASCULAR: Regular rate and rhythm; no murmurs, rubs, or gallops  GASTROINTESTINAL: Soft, non-tender, non-distended; bowel sounds present  MUSCULOSKELETAL: 2+ peripheral pulses; no clubbing, no cyanosis, no edema  NEUROLOGY: non-focal  SKIN: No rashes or lesions; warm and dry CONSTITUTIONAL: No acute distress, well-developed, well-groomed, AAOx3  HEAD: Atraumatic, normocephalic  EYES: EOM intact, PERRLA, conjunctiva and sclera clear  ENT: Supple, no masses, no thyromegaly, no bruits, no JVD; moist mucous membranes  PULMONARY: Clear to auscultation bilaterally; +wheezes, +coarse lung sounds   CARDIOVASCULAR: Regular rate and rhythm; no murmurs, rubs, or gallops  GASTROINTESTINAL: Soft, non-tender, non-distended; bowel sounds present  MUSCULOSKELETAL: 2+ peripheral pulses; no clubbing, no cyanosis, no edema  NEUROLOGY: non-focal  SKIN: No rashes or lesions; warm and dry

## 2021-10-08 NOTE — CHART NOTE - NSCHARTNOTEFT_GEN_A_CORE
DO notified by RN who was notified by pt family member that pt takes fluphenazine decanoate 37.5 mg im q weekly, today was her scheduled day for the dose.     DO ordered fluphenazine decanoate 37.5 IM once

## 2021-10-08 NOTE — H&P ADULT - NSHPREVIEWOFSYSTEMS_GEN_ALL_CORE
CONSTITUTIONAL: No weakness, fevers or chills; No headaches  EYES: No visual changes, eye pain, or discharge  ENT: No vertigo; No ear pain or change in hearing; No sore throat or difficulty swallowing  NECK: No pain or stiffness  RESPIRATORY: No cough, wheezing, or hemoptysis; No shortness of breath  CARDIOVASCULAR: No chest pain or palpitations  GASTROINTESTINAL: No abdominal or epigastric pain; No nausea, vomiting, or hematemesis; No diarrhea or constipation; No melena or hematochezia  GENITOURINARY: No dysuria, frequency or hematuria  MUSCULOSKELETAL: No joint pain, no muscle pain, no weakness  NEUROLOGICAL: No numbness or weakness  SKIN: No itching or rashes CONSTITUTIONAL:  + weakness, fevers or chills; No headaches  EYES: No visual changes, eye pain, or discharge  ENT: No vertigo; No ear pain or change in hearing; No sore throat or difficulty swallowing  NECK: No pain or stiffness  RESPIRATORY: +cough, wheezing, or hemoptysis; No shortness of breath  CARDIOVASCULAR: No chest pain or palpitations  GASTROINTESTINAL: No abdominal or epigastric pain; No nausea, vomiting, or hematemesis; No diarrhea or constipation; No melena or hematochezia  GENITOURINARY: No dysuria, frequency or hematuria  MUSCULOSKELETAL: No joint pain, no muscle pain, no weakness  NEUROLOGICAL: No numbness or weakness  SKIN: No itching or rashes

## 2021-10-08 NOTE — H&P ADULT - NSHPLABSRESULTS_GEN_ALL_CORE
VITAL SIGNS: Last 24 Hours  T(C): 37.6 (07 Oct 2021 20:38), Max: 37.9 (07 Oct 2021 17:55)  T(F): 99.7 (07 Oct 2021 20:38), Max: 100.3 (07 Oct 2021 17:55)  HR: 76 (08 Oct 2021 01:40) (76 - 92)  BP: 124/76 (08 Oct 2021 01:40) (103/72 - 124/76)  BP(mean): --  RR: 20 (08 Oct 2021 01:40) (18 - 20)  SpO2: 98% (08 Oct 2021 01:40) (95% - 98%)    LABS:                        12.4   9.19  )-----------( 151      ( 07 Oct 2021 17:54 )             37.0     10-07    141  |  98  |  20  ----------------------------<  98  4.4   |  29  |  1.4    Ca    9.2      07 Oct 2021 17:54    TPro  6.2  /  Alb  3.9  /  TBili  0.2  /  DBili  x   /  AST  23  /  ALT  14  /  AlkPhos  53  10-07          Lactate, Blood: 1.8 mmol/L (10-07-21 @ 23:50)  Troponin T, Serum: 0.26 ng/mL *HH* (10-07-21 @ 17:54)      CARDIAC MARKERS ( 07 Oct 2021 17:54 )  x     / 0.26 ng/mL / x     / x     / x          RADIOLOGY:

## 2021-10-09 LAB
ALBUMIN SERPL ELPH-MCNC: 3.4 G/DL — LOW (ref 3.5–5.2)
ALP SERPL-CCNC: 46 U/L — SIGNIFICANT CHANGE UP (ref 30–115)
ALT FLD-CCNC: 16 U/L — SIGNIFICANT CHANGE UP (ref 0–41)
ANION GAP SERPL CALC-SCNC: 11 MMOL/L — SIGNIFICANT CHANGE UP (ref 7–14)
AST SERPL-CCNC: 32 U/L — SIGNIFICANT CHANGE UP (ref 0–41)
BASOPHILS # BLD AUTO: 0.02 K/UL — SIGNIFICANT CHANGE UP (ref 0–0.2)
BASOPHILS NFR BLD AUTO: 0.4 % — SIGNIFICANT CHANGE UP (ref 0–1)
BILIRUB SERPL-MCNC: 0.2 MG/DL — SIGNIFICANT CHANGE UP (ref 0.2–1.2)
BUN SERPL-MCNC: 23 MG/DL — HIGH (ref 10–20)
CALCIUM SERPL-MCNC: 8.5 MG/DL — SIGNIFICANT CHANGE UP (ref 8.5–10.1)
CHLORIDE SERPL-SCNC: 100 MMOL/L — SIGNIFICANT CHANGE UP (ref 98–110)
CO2 SERPL-SCNC: 28 MMOL/L — SIGNIFICANT CHANGE UP (ref 17–32)
COVID-19 SPIKE DOMAIN AB INTERP: POSITIVE
COVID-19 SPIKE DOMAIN ANTIBODY RESULT: 99.1 U/ML — HIGH
CREAT SERPL-MCNC: 1.1 MG/DL — SIGNIFICANT CHANGE UP (ref 0.7–1.5)
EOSINOPHIL # BLD AUTO: 0.03 K/UL — SIGNIFICANT CHANGE UP (ref 0–0.7)
EOSINOPHIL NFR BLD AUTO: 0.5 % — SIGNIFICANT CHANGE UP (ref 0–8)
GLUCOSE SERPL-MCNC: 94 MG/DL — SIGNIFICANT CHANGE UP (ref 70–99)
HCT VFR BLD CALC: 35 % — LOW (ref 37–47)
HGB BLD-MCNC: 11.6 G/DL — LOW (ref 12–16)
IMM GRANULOCYTES NFR BLD AUTO: 0.5 % — HIGH (ref 0.1–0.3)
LYMPHOCYTES # BLD AUTO: 1.46 K/UL — SIGNIFICANT CHANGE UP (ref 1.2–3.4)
LYMPHOCYTES # BLD AUTO: 26.1 % — SIGNIFICANT CHANGE UP (ref 20.5–51.1)
MAGNESIUM SERPL-MCNC: 2.4 MG/DL — SIGNIFICANT CHANGE UP (ref 1.8–2.4)
MCHC RBC-ENTMCNC: 32.1 PG — HIGH (ref 27–31)
MCHC RBC-ENTMCNC: 33.1 G/DL — SIGNIFICANT CHANGE UP (ref 32–37)
MCV RBC AUTO: 97 FL — SIGNIFICANT CHANGE UP (ref 81–99)
MONOCYTES # BLD AUTO: 0.85 K/UL — HIGH (ref 0.1–0.6)
MONOCYTES NFR BLD AUTO: 15.2 % — HIGH (ref 1.7–9.3)
NEUTROPHILS # BLD AUTO: 3.2 K/UL — SIGNIFICANT CHANGE UP (ref 1.4–6.5)
NEUTROPHILS NFR BLD AUTO: 57.3 % — SIGNIFICANT CHANGE UP (ref 42.2–75.2)
NRBC # BLD: 0 /100 WBCS — SIGNIFICANT CHANGE UP (ref 0–0)
PHOSPHATE SERPL-MCNC: 3.2 MG/DL — SIGNIFICANT CHANGE UP (ref 2.1–4.9)
PLATELET # BLD AUTO: 138 K/UL — SIGNIFICANT CHANGE UP (ref 130–400)
POTASSIUM SERPL-MCNC: 3.4 MMOL/L — LOW (ref 3.5–5)
POTASSIUM SERPL-SCNC: 3.4 MMOL/L — LOW (ref 3.5–5)
PROT SERPL-MCNC: 5.4 G/DL — LOW (ref 6–8)
RBC # BLD: 3.61 M/UL — LOW (ref 4.2–5.4)
RBC # FLD: 13.9 % — SIGNIFICANT CHANGE UP (ref 11.5–14.5)
SARS-COV-2 IGG+IGM SERPL QL IA: 99.1 U/ML — HIGH
SARS-COV-2 IGG+IGM SERPL QL IA: POSITIVE
SODIUM SERPL-SCNC: 139 MMOL/L — SIGNIFICANT CHANGE UP (ref 135–146)
WBC # BLD: 5.59 K/UL — SIGNIFICANT CHANGE UP (ref 4.8–10.8)
WBC # FLD AUTO: 5.59 K/UL — SIGNIFICANT CHANGE UP (ref 4.8–10.8)

## 2021-10-09 PROCEDURE — 99233 SBSQ HOSP IP/OBS HIGH 50: CPT

## 2021-10-09 RX ORDER — CHOLECALCIFEROL (VITAMIN D3) 125 MCG
1000 CAPSULE ORAL DAILY
Refills: 0 | Status: DISCONTINUED | OUTPATIENT
Start: 2021-10-09 | End: 2021-10-14

## 2021-10-09 RX ORDER — DIVALPROEX SODIUM 500 MG/1
500 TABLET, DELAYED RELEASE ORAL
Refills: 0 | Status: DISCONTINUED | OUTPATIENT
Start: 2021-10-09 | End: 2021-10-14

## 2021-10-09 RX ORDER — BENZTROPINE MESYLATE 1 MG
2 TABLET ORAL
Refills: 0 | Status: DISCONTINUED | OUTPATIENT
Start: 2021-10-09 | End: 2021-10-14

## 2021-10-09 RX ORDER — HYDROXYZINE HCL 10 MG
25 TABLET ORAL
Refills: 0 | Status: DISCONTINUED | OUTPATIENT
Start: 2021-10-09 | End: 2021-10-14

## 2021-10-09 RX ORDER — LURASIDONE HYDROCHLORIDE 40 MG/1
160 TABLET ORAL DAILY
Refills: 0 | Status: DISCONTINUED | OUTPATIENT
Start: 2021-10-09 | End: 2021-10-14

## 2021-10-09 RX ORDER — DIVALPROEX SODIUM 500 MG/1
250 TABLET, DELAYED RELEASE ORAL AT BEDTIME
Refills: 0 | Status: DISCONTINUED | OUTPATIENT
Start: 2021-10-09 | End: 2021-10-14

## 2021-10-09 RX ADMIN — Medication 81 MILLIGRAM(S): at 11:44

## 2021-10-09 RX ADMIN — Medication 3 MILLILITER(S): at 13:22

## 2021-10-09 RX ADMIN — LISINOPRIL 40 MILLIGRAM(S): 2.5 TABLET ORAL at 05:49

## 2021-10-09 RX ADMIN — HEPARIN SODIUM 5000 UNIT(S): 5000 INJECTION INTRAVENOUS; SUBCUTANEOUS at 21:44

## 2021-10-09 RX ADMIN — PANTOPRAZOLE SODIUM 40 MILLIGRAM(S): 20 TABLET, DELAYED RELEASE ORAL at 21:44

## 2021-10-09 RX ADMIN — Medication 3 MILLILITER(S): at 19:41

## 2021-10-09 RX ADMIN — Medication 2 MILLIGRAM(S): at 17:41

## 2021-10-09 RX ADMIN — LURASIDONE HYDROCHLORIDE 80 MILLIGRAM(S): 40 TABLET ORAL at 05:51

## 2021-10-09 RX ADMIN — HEPARIN SODIUM 5000 UNIT(S): 5000 INJECTION INTRAVENOUS; SUBCUTANEOUS at 14:22

## 2021-10-09 RX ADMIN — AMLODIPINE BESYLATE 5 MILLIGRAM(S): 2.5 TABLET ORAL at 05:50

## 2021-10-09 RX ADMIN — DIVALPROEX SODIUM 250 MILLIGRAM(S): 500 TABLET, DELAYED RELEASE ORAL at 21:45

## 2021-10-09 RX ADMIN — Medication 20 MILLIGRAM(S): at 05:49

## 2021-10-09 RX ADMIN — BUDESONIDE AND FORMOTEROL FUMARATE DIHYDRATE 2 PUFF(S): 160; 4.5 AEROSOL RESPIRATORY (INHALATION) at 10:52

## 2021-10-09 RX ADMIN — Medication 25 MILLIGRAM(S): at 21:43

## 2021-10-09 RX ADMIN — Medication 25 MILLIGRAM(S): at 05:49

## 2021-10-09 RX ADMIN — HEPARIN SODIUM 5000 UNIT(S): 5000 INJECTION INTRAVENOUS; SUBCUTANEOUS at 05:50

## 2021-10-09 RX ADMIN — BUDESONIDE AND FORMOTEROL FUMARATE DIHYDRATE 2 PUFF(S): 160; 4.5 AEROSOL RESPIRATORY (INHALATION) at 21:53

## 2021-10-09 RX ADMIN — Medication 3 MILLILITER(S): at 09:20

## 2021-10-09 RX ADMIN — Medication 88 MICROGRAM(S): at 05:49

## 2021-10-09 RX ADMIN — ATORVASTATIN CALCIUM 40 MILLIGRAM(S): 80 TABLET, FILM COATED ORAL at 21:42

## 2021-10-09 RX ADMIN — Medication 40 MILLIGRAM(S): at 05:49

## 2021-10-09 RX ADMIN — DIVALPROEX SODIUM 500 MILLIGRAM(S): 500 TABLET, DELAYED RELEASE ORAL at 18:13

## 2021-10-09 NOTE — CHART NOTE - NSCHARTNOTEFT_GEN_A_CORE
Spoke w/ [facility] [place]. They are requesting consideration for pt to be placed at a higher level of care, as they are a psych facility and they are having difficulty dealing with her medical issues. Obtained current medication list:    -Tylenol 500mg TID PRN  -Benztropine 2mg BID  -Vit D 1 tab daily  -Divalproex 250mg qhs  -Divalproex 500mg BID  -Docusate 300mg qhs  -Fluphenazine 37.5mg IM q2w  -Furosemide 20mg qd  -HCTZ 25mg qd  -Hydroxyzine 20mg qd  -Hydroxyzine 25mg qhs  -Levothyroxine 88mcg qd  -Lisinopril 40mg qd  -Latuda 160mg qd  -Propranolol 10mg qd  -Rosuvastatin 10mg qd  -Ventolin inhaler PRN    --  Pancho Persaud, PGY-1  Department of Medicine

## 2021-10-09 NOTE — PROGRESS NOTE ADULT - SUBJECTIVE AND OBJECTIVE BOX
pt seen and examined.   she is still experiencing sob and duggan.     no chest pain,     Patient is a 65 yo F w/ PMHx of HTN, DLD, hypothyroidism, COPD and schizophrenia was sent from assisted living for worsening shortness of breath. Patient has been experiencing weakness and fatigue for the past couple of days, also notes having cough w/ productive sputum. Denies fever/chills, HA, CP, palpitations, orthopnea, PND or lower extremity edema, recent sick contacts. On admission VS stable, WBC nl, Trop 0.26,  COVID was negative, RVP + for rhinovirus.  (08 Oct 2021 02:28)      ROS: no cp,  no n/v, no fever    Vital Signs Last 24 Hrs  T(C): 36.7 (09 Oct 2021 05:41), Max: 36.7 (09 Oct 2021 05:41)  T(F): 98.1 (09 Oct 2021 05:41), Max: 98.1 (09 Oct 2021 05:41)  HR: 72 (09 Oct 2021 05:41) (69 - 72)  BP: 125/55 (09 Oct 2021 05:41) (120/56 - 125/55)  BP(mean): --  RR: 18 (09 Oct 2021 05:41) (18 - 20)  SpO2: 93% (09 Oct 2021 09:17) (93% - 97%)    Physical exam:   constitutional NAD, AAOX3, Respiratory  lungs lungs bilat wheezing,  CVS heart RRR, GI: abdomen Soft NT, ND, BS+, skin: intact  neuro exam Motor, sensory and CN normal, no deficit                           11.6   5.59  )-----------( 138      ( 09 Oct 2021 04:30 )             35.0     10-09    139  |  100  |  23<H>  ----------------------------<  94  3.4<L>   |  28  |  1.1    Ca    8.5      09 Oct 2021 04:30  Phos  3.2     10-09  Mg     2.4     10-09    TPro  5.4<L>  /  Alb  3.4<L>  /  TBili  0.2  /  DBili  x   /  AST  32  /  ALT  16  /  AlkPhos  46  10-09          CARDIAC MARKERS ( 08 Oct 2021 05:45 )  x     / 0.15 ng/mL / 473 U/L / x     / 11.9 ng/mL  CARDIAC MARKERS ( 08 Oct 2021 01:30 )  x     / 0.20 ng/mL / 502 U/L / x     / 12.2 ng/mL  CARDIAC MARKERS ( 07 Oct 2021 17:54 )  x     / 0.26 ng/mL / x     / x     / x              Culture - Blood (collected 07 Oct 2021 17:54)  Source: .Blood Blood-Peripheral  Preliminary Report (08 Oct 2021 23:01):    No growth to date.    Culture - Blood (collected 07 Oct 2021 17:54)  Source: .Blood Blood-Peripheral  Preliminary Report (08 Oct 2021 23:01):    No growth to date.      MEDICATIONS  (STANDING):  albuterol/ipratropium for Nebulization 3 milliLiter(s) Nebulizer every 4 hours  amLODIPine   Tablet 5 milliGRAM(s) Oral daily  aspirin  chewable 81 milliGRAM(s) Oral daily  atorvastatin 40 milliGRAM(s) Oral at bedtime  benztropine 2 milliGRAM(s) Oral daily  budesonide  80 MICROgram(s)/formoterol 4.5 MICROgram(s) Inhaler 2 Puff(s) Inhalation two times a day  furosemide    Tablet 20 milliGRAM(s) Oral daily  heparin   Injectable 5000 Unit(s) SubCutaneous every 8 hours  hydrochlorothiazide 25 milliGRAM(s) Oral daily  hydrOXYzine hydrochloride 10 milliGRAM(s) Oral daily  levothyroxine 88 MICROGram(s) Oral daily  lisinopril 40 milliGRAM(s) Oral daily  lurasidone 80 milliGRAM(s) Oral daily  pantoprazole  Injectable 40 milliGRAM(s) IV Push at bedtime  predniSONE   Tablet 40 milliGRAM(s) Oral daily  propranolol 10 milliGRAM(s) Oral daily      PAST MEDICAL & SURGICAL HISTORY: HTN, DLD, hypothyroidism, COPD and schizophrenia    < from: Xray Chest 1 View-PORTABLE IMMEDIATE (Xray Chest 1 View-PORTABLE IMMEDIATE .) (10.07.21 @ 18:30) >  Heart is normal in size. The patient has significant rightward curvature of the thoracic spine as well as a tortuous thoracic aorta..    No focal consolidation.    No pleural effusion or pneumothorax.    < end of copied text >    < from: CT Angio Chest PE Protocol w/ IV Cont (10.07.21 @ 23:25) >  PULMONARY ARTERIES: No evidence for acute pulmonary embolism.    LUNGS, PLEURA AND AIRWAYS: No focal consolidation, pleural effusion or pneumothorax. No evidence for central endobronchial lesion. Bilateral areas of subsegmental atelectasis.    HEART AND VESSELS: Heart size appears within normal limits. No pericardial effusion is present. Normal caliber thoracic aorta. Normal caliber main pulmonary artery.    THORACIC INLET/MEDIASTINUM/LYMPH NODES: No enlarged thoracic lymph nodes    UPPER ABDOMEN: Limited evaluation of the upperabdomen demonstrates no acute abnormality. There is a partially imaged 3.1 x 2.0 cm right adrenal gland myelolipoma    BONES AND SOFT TISSUES: Scoliotic and degenerative changes of the spine    < end of copied text >        a/p  # COPD exa , improving, cont current meds,   # adrenal myelolipoma, 3.1x2 cm , needs functiona workup , even though this can be done as outpt, but since pt is not able to followup closely as outpt will ask for endo input as in pt  # HTN , DLD< hypothyroidism, and schizophrenia, stable  cont meds    #Progress Note Handoff  Pending (specify):  Consults_endo,   Family discussion: brigida pt  Disposition: Home__

## 2021-10-10 PROCEDURE — 99233 SBSQ HOSP IP/OBS HIGH 50: CPT

## 2021-10-10 RX ORDER — POTASSIUM CHLORIDE 20 MEQ
20 PACKET (EA) ORAL
Refills: 0 | Status: COMPLETED | OUTPATIENT
Start: 2021-10-10 | End: 2021-10-10

## 2021-10-10 RX ADMIN — HEPARIN SODIUM 5000 UNIT(S): 5000 INJECTION INTRAVENOUS; SUBCUTANEOUS at 21:11

## 2021-10-10 RX ADMIN — PANTOPRAZOLE SODIUM 40 MILLIGRAM(S): 20 TABLET, DELAYED RELEASE ORAL at 21:11

## 2021-10-10 RX ADMIN — Medication 50 MILLIEQUIVALENT(S): at 15:32

## 2021-10-10 RX ADMIN — HEPARIN SODIUM 5000 UNIT(S): 5000 INJECTION INTRAVENOUS; SUBCUTANEOUS at 05:37

## 2021-10-10 RX ADMIN — LISINOPRIL 40 MILLIGRAM(S): 2.5 TABLET ORAL at 05:36

## 2021-10-10 RX ADMIN — ATORVASTATIN CALCIUM 40 MILLIGRAM(S): 80 TABLET, FILM COATED ORAL at 21:10

## 2021-10-10 RX ADMIN — Medication 81 MILLIGRAM(S): at 12:01

## 2021-10-10 RX ADMIN — Medication 20 MILLIGRAM(S): at 05:36

## 2021-10-10 RX ADMIN — DIVALPROEX SODIUM 500 MILLIGRAM(S): 500 TABLET, DELAYED RELEASE ORAL at 17:13

## 2021-10-10 RX ADMIN — BUDESONIDE AND FORMOTEROL FUMARATE DIHYDRATE 2 PUFF(S): 160; 4.5 AEROSOL RESPIRATORY (INHALATION) at 20:20

## 2021-10-10 RX ADMIN — Medication 40 MILLIGRAM(S): at 05:37

## 2021-10-10 RX ADMIN — Medication 50 MILLIEQUIVALENT(S): at 14:11

## 2021-10-10 RX ADMIN — Medication 2 MILLIGRAM(S): at 05:35

## 2021-10-10 RX ADMIN — Medication 25 MILLIGRAM(S): at 21:11

## 2021-10-10 RX ADMIN — DIVALPROEX SODIUM 500 MILLIGRAM(S): 500 TABLET, DELAYED RELEASE ORAL at 05:40

## 2021-10-10 RX ADMIN — LURASIDONE HYDROCHLORIDE 160 MILLIGRAM(S): 40 TABLET ORAL at 12:01

## 2021-10-10 RX ADMIN — Medication 2 MILLIGRAM(S): at 17:14

## 2021-10-10 RX ADMIN — AMLODIPINE BESYLATE 5 MILLIGRAM(S): 2.5 TABLET ORAL at 05:36

## 2021-10-10 RX ADMIN — DIVALPROEX SODIUM 250 MILLIGRAM(S): 500 TABLET, DELAYED RELEASE ORAL at 21:10

## 2021-10-10 RX ADMIN — Medication 25 MILLIGRAM(S): at 05:39

## 2021-10-10 RX ADMIN — HEPARIN SODIUM 5000 UNIT(S): 5000 INJECTION INTRAVENOUS; SUBCUTANEOUS at 17:11

## 2021-10-10 RX ADMIN — Medication 25 MILLIGRAM(S): at 05:36

## 2021-10-10 RX ADMIN — Medication 50 MILLIEQUIVALENT(S): at 19:14

## 2021-10-10 RX ADMIN — Medication 88 MICROGRAM(S): at 05:36

## 2021-10-10 RX ADMIN — Medication 1000 UNIT(S): at 12:01

## 2021-10-10 NOTE — PROGRESS NOTE ADULT - SUBJECTIVE AND OBJECTIVE BOX
pt seen and examined. sob is slightly better. wants to go back to sleep      Patient is a 67 yo F w/ PMHx of HTN, DLD, hypothyroidism, COPD and schizophrenia was sent in for worsening shortness of breath. Patient has been experiencing weakness and fatigue for the past couple of days, also notes having cough w/ productive sputum. Denies fever/chills, HA, CP, palpitations, orthopnea, PND or lower extremity edema, recent sick contacts. On admission VS stable, WBC nl, Trop 0.26,  COVID was negative, RVP + for rhinovirus.     ROS: no cp,  no n/v, no fever    Vital Signs Last 24 Hrs  T(C): 36 (10 Oct 2021 05:34), Max: 36.4 (09 Oct 2021 14:06)  T(F): 96.8 (10 Oct 2021 05:34), Max: 97.6 (09 Oct 2021 14:06)  HR: 64 (10 Oct 2021 05:34) (64 - 68)  BP: 126/62 (10 Oct 2021 05:34) (113/60 - 126/62)  RR: 18 (10 Oct 2021 05:34) (18 - 18)    Physical exam:   constitutional NAD, AAOX3, Respiratory  lungs CTA, CVS heart RRR, GI: abdomen Soft NT, ND, BS+, skin: intact  neuro exam Motor, sensory and CN normal, no deficit                         11.6   5.59  )-----------( 138      ( 09 Oct 2021 04:30 )             35.0     10-09    139  |  100  |  23<H>  ----------------------------<  94  3.4<L>   |  28  |  1.1    Ca    8.5      09 Oct 2021 04:30  Phos  3.2     10-09  Mg     2.4     10-09    TPro  5.4<L>  /  Alb  3.4<L>  /  TBili  0.2  /  DBili  x   /  AST  32  /  ALT  16  /  AlkPhos  46  10-09    creatinine trend  1.1 (10-09-21 @ 04:30)  0.9 (10-08-21 @ 05:45)  1.4 (10-07-21 @ 17:54)    Culture - Blood (collected 07 Oct 2021 17:54)  Source: .Blood Blood-Peripheral  Preliminary Report (08 Oct 2021 23:01):    No growth to date.    Culture - Blood (collected 07 Oct 2021 17:54)  Source: .Blood Blood-Peripheral  Preliminary Report (08 Oct 2021 23:01):    No growth to date.      MEDICATIONS  (STANDING):  albuterol/ipratropium for Nebulization 3 milliLiter(s) Nebulizer every 4 hours  amLODIPine   Tablet 5 milliGRAM(s) Oral daily  aspirin  chewable 81 milliGRAM(s) Oral daily  atorvastatin 40 milliGRAM(s) Oral at bedtime  benztropine 2 milliGRAM(s) Oral two times a day  budesonide  80 MICROgram(s)/formoterol 4.5 MICROgram(s) Inhaler 2 Puff(s) Inhalation two times a day  cholecalciferol 1000 Unit(s) Oral daily  diVALproex  milliGRAM(s) Oral two times a day  diVALproex  milliGRAM(s) Oral at bedtime  furosemide    Tablet 20 milliGRAM(s) Oral daily  heparin   Injectable 5000 Unit(s) SubCutaneous every 8 hours  hydrochlorothiazide 25 milliGRAM(s) Oral daily  hydrOXYzine hydrochloride 25 milliGRAM(s) Oral <User Schedule>  levothyroxine 88 MICROGram(s) Oral daily  lisinopril 40 milliGRAM(s) Oral daily  lurasidone 160 milliGRAM(s) Oral daily  pantoprazole  Injectable 40 milliGRAM(s) IV Push at bedtime  potassium chloride  20 mEq/100 mL IVPB 20 milliEquivalent(s) IV Intermittent every 2 hours  predniSONE   Tablet 40 milliGRAM(s) Oral daily  propranolol 10 milliGRAM(s) Oral daily    home meds    -Tylenol 500mg TID PRN  -Benztropine 2mg BID  -Vit D 1 tab daily  -Divalproex 250mg qhs  -Divalproex 500mg BID  -Docusate 300mg qhs  -Fluphenazine 37.5mg IM q2w  -Furosemide 20mg qd  -HCTZ 25mg qd  -Hydroxyzine 20mg qd  -Hydroxyzine 25mg qhs  -Levothyroxine 88mcg qd  -Lisinopril 40mg qd  -Latuda 160mg qd  -Propranolol 10mg qd  -Rosuvastatin 10mg qd  -Ventolin inhaler PRN      PAST MEDICAL & SURGICAL HISTORY:  HTN, DLD, hypothyroidism, COPD and schizophrenia    < from: Xray Chest 1 View-PORTABLE IMMEDIATE (Xray Chest 1 View-PORTABLE IMMEDIATE .) (10.07.21 @ 18:30) >  Heart is normal in size. The patient has significant rightward curvature of the thoracic spine as well as a tortuous thoracic aorta..    No focal consolidation.    No pleural effusion or pneumothorax.    < end of copied text >    CARDIAC MARKERS ( 08 Oct 2021 05:45 )  x     / 0.15 ng/mL / 473 U/L / x     / 11.9 ng/mL  CARDIAC MARKERS ( 08 Oct 2021 01:30 )  x     / 0.20 ng/mL / 502 U/L / x     / 12.2 ng/mL  CARDIAC MARKERS ( 07 Oct 2021 17:54 )  x     / 0.26 ng/mL / x     / x     / x    < from: CT Angio Chest PE Protocol w/ IV Cont (10.07.21 @ 23:25) >  PULMONARY ARTERIES: No evidence for acute pulmonary embolism.    LUNGS, PLEURA AND AIRWAYS: No focal consolidation, pleural effusion or pneumothorax. No evidence for central endobronchial lesion. Bilateral areas of subsegmental atelectasis.    HEART AND VESSELS: Heart size appears within normal limits. No pericardial effusion is present. Normal caliber thoracic aorta. Normal caliber main pulmonary artery.    THORACIC INLET/MEDIASTINUM/LYMPH NODES: No enlarged thoracic lymph nodes    UPPER ABDOMEN: Limited evaluation of the upperabdomen demonstrates no acute abnormality. There is a partially imaged 3.1 x 2.0 cm right adrenal gland myelolipoma    BONES AND SOFT TISSUES: Scoliotic and degenerative changes of the spine    < end of copied text >      a/p  # copd exac due to Entero/Rhinovirus (RapRVP): Detected (10.07.21 @ 17:10) cont nebs and steroids  # positive troponin, probably type 2 mi, repeat trop, check echo   # HTN,  controlled, cont meds  # DLD, controlled, cont meds  # hypothyroidism, controlled cont meds,   # schizophrenia stable cont meds    as per discussion from her residence place with on call resident, pt may not be able to return there after the weekend.  to discuss options with pt.     #Progress Note Handoff  Pending (specify):  repeat trop, echo  Family discussion: dw pt   Disposition: TBD

## 2021-10-11 LAB
ALBUMIN SERPL ELPH-MCNC: 4.1 G/DL — SIGNIFICANT CHANGE UP (ref 3.5–5.2)
ALP SERPL-CCNC: 56 U/L — SIGNIFICANT CHANGE UP (ref 30–115)
ALT FLD-CCNC: 19 U/L — SIGNIFICANT CHANGE UP (ref 0–41)
ANION GAP SERPL CALC-SCNC: 14 MMOL/L — SIGNIFICANT CHANGE UP (ref 7–14)
AST SERPL-CCNC: 24 U/L — SIGNIFICANT CHANGE UP (ref 0–41)
BASOPHILS # BLD AUTO: 0.03 K/UL — SIGNIFICANT CHANGE UP (ref 0–0.2)
BASOPHILS NFR BLD AUTO: 0.4 % — SIGNIFICANT CHANGE UP (ref 0–1)
BILIRUB SERPL-MCNC: 0.3 MG/DL — SIGNIFICANT CHANGE UP (ref 0.2–1.2)
BUN SERPL-MCNC: 15 MG/DL — SIGNIFICANT CHANGE UP (ref 10–20)
CALCIUM SERPL-MCNC: 9.5 MG/DL — SIGNIFICANT CHANGE UP (ref 8.5–10.1)
CHLORIDE SERPL-SCNC: 100 MMOL/L — SIGNIFICANT CHANGE UP (ref 98–110)
CO2 SERPL-SCNC: 30 MMOL/L — SIGNIFICANT CHANGE UP (ref 17–32)
CREAT SERPL-MCNC: 1 MG/DL — SIGNIFICANT CHANGE UP (ref 0.7–1.5)
EOSINOPHIL # BLD AUTO: 0.01 K/UL — SIGNIFICANT CHANGE UP (ref 0–0.7)
EOSINOPHIL NFR BLD AUTO: 0.1 % — SIGNIFICANT CHANGE UP (ref 0–8)
GLUCOSE SERPL-MCNC: 100 MG/DL — HIGH (ref 70–99)
HCT VFR BLD CALC: 40.5 % — SIGNIFICANT CHANGE UP (ref 37–47)
HGB BLD-MCNC: 13.3 G/DL — SIGNIFICANT CHANGE UP (ref 12–16)
IMM GRANULOCYTES NFR BLD AUTO: 1.5 % — HIGH (ref 0.1–0.3)
LYMPHOCYTES # BLD AUTO: 1.15 K/UL — LOW (ref 1.2–3.4)
LYMPHOCYTES # BLD AUTO: 17.2 % — LOW (ref 20.5–51.1)
MAGNESIUM SERPL-MCNC: 2 MG/DL — SIGNIFICANT CHANGE UP (ref 1.8–2.4)
MCHC RBC-ENTMCNC: 32.3 PG — HIGH (ref 27–31)
MCHC RBC-ENTMCNC: 32.8 G/DL — SIGNIFICANT CHANGE UP (ref 32–37)
MCV RBC AUTO: 98.3 FL — SIGNIFICANT CHANGE UP (ref 81–99)
MONOCYTES # BLD AUTO: 0.4 K/UL — SIGNIFICANT CHANGE UP (ref 0.1–0.6)
MONOCYTES NFR BLD AUTO: 6 % — SIGNIFICANT CHANGE UP (ref 1.7–9.3)
NEUTROPHILS # BLD AUTO: 5.01 K/UL — SIGNIFICANT CHANGE UP (ref 1.4–6.5)
NEUTROPHILS NFR BLD AUTO: 74.8 % — SIGNIFICANT CHANGE UP (ref 42.2–75.2)
NRBC # BLD: 0 /100 WBCS — SIGNIFICANT CHANGE UP (ref 0–0)
PHOSPHATE SERPL-MCNC: 2.9 MG/DL — SIGNIFICANT CHANGE UP (ref 2.1–4.9)
PLATELET # BLD AUTO: 182 K/UL — SIGNIFICANT CHANGE UP (ref 130–400)
POTASSIUM SERPL-MCNC: 4.6 MMOL/L — SIGNIFICANT CHANGE UP (ref 3.5–5)
POTASSIUM SERPL-SCNC: 4.6 MMOL/L — SIGNIFICANT CHANGE UP (ref 3.5–5)
PROT SERPL-MCNC: 6.4 G/DL — SIGNIFICANT CHANGE UP (ref 6–8)
RBC # BLD: 4.12 M/UL — LOW (ref 4.2–5.4)
RBC # FLD: 13.5 % — SIGNIFICANT CHANGE UP (ref 11.5–14.5)
SARS-COV-2 RNA SPEC QL NAA+PROBE: SIGNIFICANT CHANGE UP
SODIUM SERPL-SCNC: 144 MMOL/L — SIGNIFICANT CHANGE UP (ref 135–146)
TROPONIN T SERPL-MCNC: 0.02 NG/ML — HIGH
WBC # BLD: 6.7 K/UL — SIGNIFICANT CHANGE UP (ref 4.8–10.8)
WBC # FLD AUTO: 6.7 K/UL — SIGNIFICANT CHANGE UP (ref 4.8–10.8)

## 2021-10-11 PROCEDURE — 99221 1ST HOSP IP/OBS SF/LOW 40: CPT

## 2021-10-11 PROCEDURE — 99233 SBSQ HOSP IP/OBS HIGH 50: CPT

## 2021-10-11 PROCEDURE — 99232 SBSQ HOSP IP/OBS MODERATE 35: CPT

## 2021-10-11 RX ORDER — REGADENOSON 0.08 MG/ML
0.4 INJECTION, SOLUTION INTRAVENOUS ONCE
Refills: 0 | Status: DISCONTINUED | OUTPATIENT
Start: 2021-10-11 | End: 2021-10-14

## 2021-10-11 RX ADMIN — Medication 3 MILLILITER(S): at 08:25

## 2021-10-11 RX ADMIN — PANTOPRAZOLE SODIUM 40 MILLIGRAM(S): 20 TABLET, DELAYED RELEASE ORAL at 21:29

## 2021-10-11 RX ADMIN — BUDESONIDE AND FORMOTEROL FUMARATE DIHYDRATE 2 PUFF(S): 160; 4.5 AEROSOL RESPIRATORY (INHALATION) at 20:06

## 2021-10-11 RX ADMIN — Medication 1000 UNIT(S): at 11:59

## 2021-10-11 RX ADMIN — DIVALPROEX SODIUM 500 MILLIGRAM(S): 500 TABLET, DELAYED RELEASE ORAL at 05:30

## 2021-10-11 RX ADMIN — DIVALPROEX SODIUM 500 MILLIGRAM(S): 500 TABLET, DELAYED RELEASE ORAL at 17:20

## 2021-10-11 RX ADMIN — Medication 88 MICROGRAM(S): at 05:30

## 2021-10-11 RX ADMIN — HEPARIN SODIUM 5000 UNIT(S): 5000 INJECTION INTRAVENOUS; SUBCUTANEOUS at 05:31

## 2021-10-11 RX ADMIN — Medication 2 MILLIGRAM(S): at 17:20

## 2021-10-11 RX ADMIN — HEPARIN SODIUM 5000 UNIT(S): 5000 INJECTION INTRAVENOUS; SUBCUTANEOUS at 12:00

## 2021-10-11 RX ADMIN — Medication 81 MILLIGRAM(S): at 11:58

## 2021-10-11 RX ADMIN — Medication 3 MILLILITER(S): at 12:18

## 2021-10-11 RX ADMIN — Medication 3 MILLILITER(S): at 20:38

## 2021-10-11 RX ADMIN — AMLODIPINE BESYLATE 5 MILLIGRAM(S): 2.5 TABLET ORAL at 05:30

## 2021-10-11 RX ADMIN — Medication 25 MILLIGRAM(S): at 05:30

## 2021-10-11 RX ADMIN — Medication 25 MILLIGRAM(S): at 05:32

## 2021-10-11 RX ADMIN — Medication 40 MILLIGRAM(S): at 05:30

## 2021-10-11 RX ADMIN — HEPARIN SODIUM 5000 UNIT(S): 5000 INJECTION INTRAVENOUS; SUBCUTANEOUS at 21:29

## 2021-10-11 RX ADMIN — Medication 25 MILLIGRAM(S): at 21:28

## 2021-10-11 RX ADMIN — Medication 2 MILLIGRAM(S): at 05:30

## 2021-10-11 RX ADMIN — DIVALPROEX SODIUM 250 MILLIGRAM(S): 500 TABLET, DELAYED RELEASE ORAL at 21:28

## 2021-10-11 RX ADMIN — Medication 3 MILLILITER(S): at 16:50

## 2021-10-11 RX ADMIN — ATORVASTATIN CALCIUM 40 MILLIGRAM(S): 80 TABLET, FILM COATED ORAL at 21:28

## 2021-10-11 RX ADMIN — LISINOPRIL 40 MILLIGRAM(S): 2.5 TABLET ORAL at 05:32

## 2021-10-11 RX ADMIN — LURASIDONE HYDROCHLORIDE 160 MILLIGRAM(S): 40 TABLET ORAL at 12:00

## 2021-10-11 RX ADMIN — Medication 20 MILLIGRAM(S): at 05:30

## 2021-10-11 NOTE — CONSULT NOTE ADULT - SUBJECTIVE AND OBJECTIVE BOX
HPI:  Patient is a 67 yo F w/ PMHx of HTN, DLD, hypothyroidism, COPD and schizophrenia was sent from assisted living for worsening shortness of breath. Patient has been experiencing weakness and fatigue for the past couple of days, also notes having cough w/ productive sputum. Denies fever/chills, HA, CP, palpitations, orthopnea, PND or lower extremity edema, recent sick contacts. On admission VS stable, WBC nl, Trop 0.26,  COVID was negative, RVP + for rhinovirus.  (08 Oct 2021 02:28)      patient sleeping in bed responsive but unable to give any history state she is sleepy     PAST MEDICAL & SURGICAL HISTORY      FAMILY HISTORY:  FAMILY HISTORY:      SOCIAL HISTORY:  []smoker  []Alcohol  []Drug    ALLERGIES:  Dilantin (Unknown)  Haldol (Unknown)      MEDICATIONS:  MEDICATIONS  (STANDING):  albuterol/ipratropium for Nebulization 3 milliLiter(s) Nebulizer every 4 hours  amLODIPine   Tablet 5 milliGRAM(s) Oral daily  aspirin  chewable 81 milliGRAM(s) Oral daily  atorvastatin 40 milliGRAM(s) Oral at bedtime  benztropine 2 milliGRAM(s) Oral two times a day  budesonide  80 MICROgram(s)/formoterol 4.5 MICROgram(s) Inhaler 2 Puff(s) Inhalation two times a day  cholecalciferol 1000 Unit(s) Oral daily  diVALproex  milliGRAM(s) Oral two times a day  diVALproex  milliGRAM(s) Oral at bedtime  furosemide    Tablet 20 milliGRAM(s) Oral daily  heparin   Injectable 5000 Unit(s) SubCutaneous every 8 hours  hydrochlorothiazide 25 milliGRAM(s) Oral daily  hydrOXYzine hydrochloride 25 milliGRAM(s) Oral <User Schedule>  levothyroxine 88 MICROGram(s) Oral daily  lisinopril 40 milliGRAM(s) Oral daily  lurasidone 160 milliGRAM(s) Oral daily  pantoprazole  Injectable 40 milliGRAM(s) IV Push at bedtime  predniSONE   Tablet 40 milliGRAM(s) Oral daily  propranolol 10 milliGRAM(s) Oral daily  regadenoson Injectable 0.4 milliGRAM(s) IV Push once    MEDICATIONS  (PRN):      HOME MEDICATIONS:  Home Medications:      VITALS:   T(F): 98.6 (10-11 @ 13:34), Max: 98.6 (10-08 @ 13:28)  HR: 67 (10-11 @ 13:34) (61 - 72)  BP: 146/72 (10-11 @ 13:34) (113/60 - 146/72)  BP(mean): 93 (10-11 @ 06:15) (93 - 93)  RR: 18 (10-11 @ 13:34) (17 - 20)  SpO2: 96% (10-11 @ 06:15) (93% - 97%)    I&O's Summary    10 Oct 2021 07:01  -  11 Oct 2021 07:00  --------------------------------------------------------  IN: 360 mL / OUT: 900 mL / NET: -540 mL        REVIEW OF SYSTEMS:  unable to obtain   she denies pain and just want to sleep     PHYSICAL EXAM:  refused exam     LABS:                        13.3   6.70  )-----------( 182      ( 11 Oct 2021 09:07 )             40.5     10-11    144  |  100  |  15  ----------------------------<  100<H>  4.6   |  30  |  1.0    Ca    9.5      11 Oct 2021 09:07  Phos  2.9     10-11  Mg     2.0     10-11    TPro  6.4  /  Alb  4.1  /  TBili  0.3  /  DBili  x   /  AST  24  /  ALT  19  /  AlkPhos  56  10-11      Troponin T, Serum: 0.02 ng/mL *H* (10-11-21 @ 00:28)    CARDIAC MARKERS ( 11 Oct 2021 00:28 )  x     / 0.02 ng/mL / x     / x     / x          10-08 Chol 139 LDL -- HDL 54 Trig 79, 10-08 Chol 142 LDL -- HDL 57 Trig 69    TSH 1.30; Total T3 63; Free T4 --    A1C with Estimated Average Glucose Result: 5.2: Method: Immunoassay   Reference Range 4.0-5.6% < from: CT Angio Chest PE Protocol w/ IV Cont (10.07.21 @ 23:25) >  UPPER ABDOMEN: Limited evaluation of the upper abdomen demonstrates no acute abnormality. There is a partially imaged 3.1 x 2.0 cm right adrenal gland myelolipoma      < end of copied text >

## 2021-10-11 NOTE — PROGRESS NOTE ADULT - ASSESSMENT
Patient is a 67 yo F w/ PMHx of HTN, DLD, hypothyroidism, COPD and schizophrenia was sent in for worsening shortness of breath. Patient has been experiencing weakness and fatigue for the past couple of days, also notes having cough w/ productive sputum. Denies fever/chills, HA, CP, palpitations, orthopnea, PND or lower extremity edema, recent sick contacts. On admission VS stable, WBC nl, Trop 0.26,  COVID was negative, RVP + for rhinovirus.       # copd exac   due to Entero/Rhinovirus (RapRVP): Detected (10.07.21 @ 17:10) cont nebs and steroids,   patient is improving   not on oxygen   sat well on room air     # positive troponin,   troponin trending down  no chest pain   there is a concern that the patient had an acs event and troponin now are trending down, however the patient had an miryam that could cause troponemia   cadio onboard and were called for f/u  f/u echo cardio    # HTN,  controlled, cont meds  # DLD, controlled, cont meds  # hypothyroidism, controlled cont meds,   # schizophrenia stable cont meds    her place of stay might not be able to accept her due to her medical problems now    Patient is a 65 yo F w/ PMHx of HTN, DLD, hypothyroidism, COPD and schizophrenia was sent in for worsening shortness of breath. Patient has been experiencing weakness and fatigue for the past couple of days, also notes having cough w/ productive sputum. Denies fever/chills, HA, CP, palpitations, orthopnea, PND or lower extremity edema, recent sick contacts. On admission VS stable, WBC nl, Trop 0.26,  COVID was negative, RVP + for rhinovirus.       f/u echo cardiologytion   due to Entero/Rhinovirus (RapRVP): Detected (10.07.21 @ 17:10) cont nebs and steroids,   patient is improving   not on oxygen   sat well on room air     # positive troponin,   troponin trending down  no chest pain   there is a concern that the patient had an acs event and troponin now are trending down, however the patient had an miryam that could cause troponemia   cadio onboard and were called for f/u  f/u echo cardio  Attending note:  SHILPA Cardio recc laith scan    # HTN,  controlled, cont meds  # DLD, controlled, cont meds  # hypothyroidism, controlled cont meds,   # schizophrenia stable cont meds    her place of stay might not be able to accept her due to her medical problems now

## 2021-10-11 NOTE — PROGRESS NOTE ADULT - ASSESSMENT
Mild troponin elevated - resolved  Likely Type 2 MI due to myocardial demand, COPD, renal insufficiency  No anginal pain.  Likely underlying CAD  Recommend medical therapy for CAD  C/w ASA, statin, ACE-I, BB    If agreeable - Lexiscan to assess the extent of ischemia.  Discussed with the primary team.

## 2021-10-11 NOTE — PROGRESS NOTE ADULT - SUBJECTIVE AND OBJECTIVE BOX
SUBJECTIVE:    Patient is a 66y old Female who presents with a chief complaint of SOB (10 Oct 2021 10:45)    Currently admitted to medicine with the primary diagnosis of NSTEMI (non-ST elevation myocardial infarction)       Today is hospital day 3d. This morning she is resting comfortably in bed and reports no new issues or overnight events.     PAST MEDICAL & SURGICAL HISTORY    SOCIAL HISTORY:  Negative for smoking/alcohol/drug use.     ALLERGIES:  Dilantin (Unknown)  Haldol (Unknown)    MEDICATIONS:  STANDING MEDICATIONS  albuterol/ipratropium for Nebulization 3 milliLiter(s) Nebulizer every 4 hours  amLODIPine   Tablet 5 milliGRAM(s) Oral daily  aspirin  chewable 81 milliGRAM(s) Oral daily  atorvastatin 40 milliGRAM(s) Oral at bedtime  benztropine 2 milliGRAM(s) Oral two times a day  budesonide  80 MICROgram(s)/formoterol 4.5 MICROgram(s) Inhaler 2 Puff(s) Inhalation two times a day  cholecalciferol 1000 Unit(s) Oral daily  diVALproex  milliGRAM(s) Oral two times a day  diVALproex  milliGRAM(s) Oral at bedtime  furosemide    Tablet 20 milliGRAM(s) Oral daily  heparin   Injectable 5000 Unit(s) SubCutaneous every 8 hours  hydrochlorothiazide 25 milliGRAM(s) Oral daily  hydrOXYzine hydrochloride 25 milliGRAM(s) Oral <User Schedule>  levothyroxine 88 MICROGram(s) Oral daily  lisinopril 40 milliGRAM(s) Oral daily  lurasidone 160 milliGRAM(s) Oral daily  pantoprazole  Injectable 40 milliGRAM(s) IV Push at bedtime  predniSONE   Tablet 40 milliGRAM(s) Oral daily  propranolol 10 milliGRAM(s) Oral daily    PRN MEDICATIONS    VITALS:   T(F): 96.5  HR: 68  BP: 136/65  RR: 18  SpO2: 96%    LABS:                        13.3   6.70  )-----------( 182      ( 11 Oct 2021 09:07 )             40.5     10-11    144  |  100  |  15  ----------------------------<  100<H>  4.6   |  30  |  1.0    Ca    9.5      11 Oct 2021 09:07  Phos  2.9     10-11  Mg     2.0     10-11    TPro  6.4  /  Alb  4.1  /  TBili  0.3  /  DBili  x   /  AST  24  /  ALT  19  /  AlkPhos  56  10-11          Troponin T, Serum: 0.02 ng/mL *H* (10-11-21 @ 00:28)      CARDIAC MARKERS ( 11 Oct 2021 00:28 )  x     / 0.02 ng/mL / x     / x     / x          RADIOLOGY:    PHYSICAL EXAM:  GEN: No acute distress  LUNGS: Clear to auscultation bilaterally   HEART: S1/S2 present. RRR.   ABD: Soft, non-tender, non-distended. Bowel sounds present  EXT: NC/NC/NE/2+PP/JACKSON/Skin Intact.   NEURO: AAOX3    Intravenous access:   NG tube:   Wilson Catheter:          SUBJECTIVE:    Patient is a 66y old Female who presents with a chief complaint of SOB (10 Oct 2021 10:45)    Currently admitted to medicine with the primary diagnosis of NSTEMI (non-ST elevation myocardial infarction)       Today is hospital day 3d. This morning she is resting comfortably in bed and reports no new issues or overnight events.     Attending Note: Pt seen and examined at bedside. No cp  or sob. Pt comfortable in bed     PAST MEDICAL & SURGICAL HISTORY    SOCIAL HISTORY:  Negative for smoking/alcohol/drug use.     ALLERGIES:  Dilantin (Unknown)  Haldol (Unknown)    MEDICATIONS:  STANDING MEDICATIONS  albuterol/ipratropium for Nebulization 3 milliLiter(s) Nebulizer every 4 hours  amLODIPine   Tablet 5 milliGRAM(s) Oral daily  aspirin  chewable 81 milliGRAM(s) Oral daily  atorvastatin 40 milliGRAM(s) Oral at bedtime  benztropine 2 milliGRAM(s) Oral two times a day  budesonide  80 MICROgram(s)/formoterol 4.5 MICROgram(s) Inhaler 2 Puff(s) Inhalation two times a day  cholecalciferol 1000 Unit(s) Oral daily  diVALproex  milliGRAM(s) Oral two times a day  diVALproex  milliGRAM(s) Oral at bedtime  furosemide    Tablet 20 milliGRAM(s) Oral daily  heparin   Injectable 5000 Unit(s) SubCutaneous every 8 hours  hydrochlorothiazide 25 milliGRAM(s) Oral daily  hydrOXYzine hydrochloride 25 milliGRAM(s) Oral <User Schedule>  levothyroxine 88 MICROGram(s) Oral daily  lisinopril 40 milliGRAM(s) Oral daily  lurasidone 160 milliGRAM(s) Oral daily  pantoprazole  Injectable 40 milliGRAM(s) IV Push at bedtime  predniSONE   Tablet 40 milliGRAM(s) Oral daily  propranolol 10 milliGRAM(s) Oral daily    PRN MEDICATIONS    VITALS:   T(F): 96.5  HR: 68  BP: 136/65  RR: 18  SpO2: 96%    LABS:                        13.3   6.70  )-----------( 182      ( 11 Oct 2021 09:07 )             40.5     10-11    144  |  100  |  15  ----------------------------<  100<H>  4.6   |  30  |  1.0    Ca    9.5      11 Oct 2021 09:07  Phos  2.9     10-11  Mg     2.0     10-11    TPro  6.4  /  Alb  4.1  /  TBili  0.3  /  DBili  x   /  AST  24  /  ALT  19  /  AlkPhos  56  10-11          Troponin T, Serum: 0.02 ng/mL *H* (10-11-21 @ 00:28)      CARDIAC MARKERS ( 11 Oct 2021 00:28 )  x     / 0.02 ng/mL / x     / x     / x          RADIOLOGY:    PHYSICAL EXAM:  GEN: No acute distress  LUNGS: Clear to auscultation bilaterally   HEART: S1/S2 present. RRR.   ABD: Soft, non-tender, non-distended. Bowel sounds present  EXT: NC/NC/NE/2+PP/JACKSON/Skin Intact.   NEURO: AAOX3    Intravenous access:   NG tube:   Wilson Catheter:

## 2021-10-11 NOTE — CONSULT NOTE ADULT - ASSESSMENT
#incidental right side 3.1 cm adrenal myelolipoma   - adrenal myolipoma usually are benign / no hormone hypersecretion   - plasma metanephrine , aldosterone and renin sent by primary team pending , patient already on steroid no screen for C[place]   - need outpatient follow up with endocrinology can be followed in [facility] [phone] #incidental right side 3.1 cm adrenal myelolipoma   - adrenal myolipoma usually are benign / no hormone hypersecretion   - plasma metanephrine , aldosterone and renin sent by primary team pending , patient already on steroid no screen for C[place]   - need outpatient follow up with endocrinology can be followed in [facility] clinic [phone]    #hypothyroidism   TSH ok , continue Lt4 88 mcg daily

## 2021-10-11 NOTE — PROGRESS NOTE ADULT - SUBJECTIVE AND OBJECTIVE BOX
Patient is a 66y old  Female who presents with a chief complaint of SOB (11 Oct 2021 16:31)    HPI:  Patient is a 67 yo F w/ PMHx of HTN, DLD, hypothyroidism, COPD and schizophrenia was sent from assisted living for worsening shortness of breath. Patient has been experiencing weakness and fatigue for the past couple of days, also notes having cough w/ productive sputum. Denies fever/chills, HA, CP, palpitations, orthopnea, PND or lower extremity edema, recent sick contacts. On admission VS stable, WBC nl, Trop 0.26,  COVID was negative, RVP + for rhinovirus.  (08 Oct 2021 02:28)      SUBJ:  Patient seen and examined. No new events.       MEDICATIONS  (STANDING):  albuterol/ipratropium for Nebulization 3 milliLiter(s) Nebulizer every 4 hours  amLODIPine   Tablet 5 milliGRAM(s) Oral daily  aspirin  chewable 81 milliGRAM(s) Oral daily  atorvastatin 40 milliGRAM(s) Oral at bedtime  benztropine 2 milliGRAM(s) Oral two times a day  budesonide  80 MICROgram(s)/formoterol 4.5 MICROgram(s) Inhaler 2 Puff(s) Inhalation two times a day  cholecalciferol 1000 Unit(s) Oral daily  diVALproex  milliGRAM(s) Oral two times a day  diVALproex  milliGRAM(s) Oral at bedtime  furosemide    Tablet 20 milliGRAM(s) Oral daily  heparin   Injectable 5000 Unit(s) SubCutaneous every 8 hours  hydrochlorothiazide 25 milliGRAM(s) Oral daily  hydrOXYzine hydrochloride 25 milliGRAM(s) Oral <User Schedule>  levothyroxine 88 MICROGram(s) Oral daily  lisinopril 40 milliGRAM(s) Oral daily  lurasidone 160 milliGRAM(s) Oral daily  pantoprazole  Injectable 40 milliGRAM(s) IV Push at bedtime  predniSONE   Tablet 40 milliGRAM(s) Oral daily  propranolol 10 milliGRAM(s) Oral daily  regadenoson Injectable 0.4 milliGRAM(s) IV Push once    MEDICATIONS  (PRN):            Vital Signs Last 24 Hrs  T(C): 37 (11 Oct 2021 13:34), Max: 37 (11 Oct 2021 13:34)  T(F): 98.6 (11 Oct 2021 13:34), Max: 98.6 (11 Oct 2021 13:34)  HR: 67 (11 Oct 2021 13:34) (61 - 68)  BP: 146/72 (11 Oct 2021 13:34) (136/65 - 146/72)  BP(mean): 93 (11 Oct 2021 06:15) (93 - 93)  RR: 18 (11 Oct 2021 13:34) (17 - 18)  SpO2: 96% (11 Oct 2021 06:15) (96% - 96%)      PHYSICAL EXAM:    GEN: NAD  HEENT: NC/AT  Neck: No JVD, no bruits  CV: Reg, S1-S2, no murmur  Lungs: decreased BS  Abd: Soft, non-tender  Ext: No edema        10-10-21 @ 07:01  -  10-11-21 @ 07:00  --------------------------------------------------------  IN: 360 mL / OUT: 900 mL / NET: -540 mL        I&O's Summary    10 Oct 2021 07:01  -  11 Oct 2021 07:00  --------------------------------------------------------  IN: 360 mL / OUT: 900 mL / NET: -540 mL          LABS:                        13.3   6.70  )-----------( 182      ( 11 Oct 2021 09:07 )             40.5     10-11    144  |  100  |  15  ----------------------------<  100<H>  4.6   |  30  |  1.0    Ca    9.5      11 Oct 2021 09:07  Phos  2.9     10-11  Mg     2.0     10-11    TPro  6.4  /  Alb  4.1  /  TBili  0.3  /  DBili  x   /  AST  24  /  ALT  19  /  AlkPhos  56  10-11    CARDIAC MARKERS ( 11 Oct 2021 00:28 )  x     / 0.02 ng/mL / x     / x     / x                BNP  RADIOLOGY & ADDITIONAL STUDIES:      IMPRESSION AND PLAN:

## 2021-10-12 LAB
ALDOST SERPL-MCNC: 6.9 NG/DL — SIGNIFICANT CHANGE UP
CULTURE RESULTS: SIGNIFICANT CHANGE UP
CULTURE RESULTS: SIGNIFICANT CHANGE UP
SPECIMEN SOURCE: SIGNIFICANT CHANGE UP
SPECIMEN SOURCE: SIGNIFICANT CHANGE UP
TROPONIN T SERPL-MCNC: <0.01 NG/ML — SIGNIFICANT CHANGE UP

## 2021-10-12 PROCEDURE — 99233 SBSQ HOSP IP/OBS HIGH 50: CPT

## 2021-10-12 RX ADMIN — AMLODIPINE BESYLATE 5 MILLIGRAM(S): 2.5 TABLET ORAL at 05:17

## 2021-10-12 RX ADMIN — DIVALPROEX SODIUM 500 MILLIGRAM(S): 500 TABLET, DELAYED RELEASE ORAL at 05:17

## 2021-10-12 RX ADMIN — HEPARIN SODIUM 5000 UNIT(S): 5000 INJECTION INTRAVENOUS; SUBCUTANEOUS at 05:17

## 2021-10-12 RX ADMIN — PANTOPRAZOLE SODIUM 40 MILLIGRAM(S): 20 TABLET, DELAYED RELEASE ORAL at 21:19

## 2021-10-12 RX ADMIN — Medication 25 MILLIGRAM(S): at 05:17

## 2021-10-12 RX ADMIN — DIVALPROEX SODIUM 250 MILLIGRAM(S): 500 TABLET, DELAYED RELEASE ORAL at 21:19

## 2021-10-12 RX ADMIN — LISINOPRIL 40 MILLIGRAM(S): 2.5 TABLET ORAL at 05:16

## 2021-10-12 RX ADMIN — Medication 20 MILLIGRAM(S): at 05:16

## 2021-10-12 RX ADMIN — Medication 81 MILLIGRAM(S): at 12:19

## 2021-10-12 RX ADMIN — Medication 40 MILLIGRAM(S): at 05:16

## 2021-10-12 RX ADMIN — BUDESONIDE AND FORMOTEROL FUMARATE DIHYDRATE 2 PUFF(S): 160; 4.5 AEROSOL RESPIRATORY (INHALATION) at 21:19

## 2021-10-12 RX ADMIN — HEPARIN SODIUM 5000 UNIT(S): 5000 INJECTION INTRAVENOUS; SUBCUTANEOUS at 14:47

## 2021-10-12 RX ADMIN — Medication 1000 UNIT(S): at 12:19

## 2021-10-12 RX ADMIN — HEPARIN SODIUM 5000 UNIT(S): 5000 INJECTION INTRAVENOUS; SUBCUTANEOUS at 21:19

## 2021-10-12 RX ADMIN — Medication 25 MILLIGRAM(S): at 21:19

## 2021-10-12 RX ADMIN — Medication 88 MICROGRAM(S): at 05:16

## 2021-10-12 RX ADMIN — Medication 2 MILLIGRAM(S): at 05:17

## 2021-10-12 RX ADMIN — LURASIDONE HYDROCHLORIDE 160 MILLIGRAM(S): 40 TABLET ORAL at 12:19

## 2021-10-12 RX ADMIN — Medication 3 MILLILITER(S): at 07:55

## 2021-10-12 RX ADMIN — ATORVASTATIN CALCIUM 40 MILLIGRAM(S): 80 TABLET, FILM COATED ORAL at 21:19

## 2021-10-12 RX ADMIN — Medication 2 MILLIGRAM(S): at 17:37

## 2021-10-12 RX ADMIN — DIVALPROEX SODIUM 500 MILLIGRAM(S): 500 TABLET, DELAYED RELEASE ORAL at 17:37

## 2021-10-12 RX ADMIN — Medication 3 MILLILITER(S): at 16:07

## 2021-10-12 RX ADMIN — Medication 3 MILLILITER(S): at 11:16

## 2021-10-12 NOTE — PROGRESS NOTE ADULT - ASSESSMENT
· Assessment	  Patient is a 67 yo F w/ PMHx of HTN, DLD, hypothyroidism, COPD and schizophrenia was sent in for worsening shortness of breath. Patient has been experiencing weakness and fatigue for the past couple of days, also notes having cough w/ productive sputum. Denies fever/chills, HA, CP, palpitations, orthopnea, PND or lower extremity edema, recent sick contacts. On admission VS stable, WBC nl, Trop 0.26,  COVID was negative, RVP + for rhinovirus.       f/u echo cardiologytion   due to Entero/Rhinovirus (RapRVP): Detected (10.07.21 @ 17:10) cont nebs and steroids,   patient is improving   not on oxygen   sat well on room air     # positive troponin,   troponin trending down  no chest pain   there is a concern that the patient had an acs event and troponin now are trending down, however the patient had an miryam that could cause troponemia   cadio onboard and were called for f/u  f/u echo cardio  DW Cardio recc laith scan    # HTN,  controlled, cont meds  # DLD, controlled, cont meds  # hypothyroidism, controlled cont meds,   # schizophrenia stable cont meds    her place of stay might not be able to accept her due to her medical problems now    · Assessment	  Patient is a 67 yo F w/ PMHx of HTN, DLD, hypothyroidism, COPD and schizophrenia was sent in for worsening shortness of breath. Patient has been experiencing weakness and fatigue for the past couple of days, also notes having cough w/ productive sputum. Denies fever/chills, HA, CP, palpitations, orthopnea, PND or lower extremity edema, recent sick contacts. On admission VS stable, WBC nl, Trop 0.26,  COVID was negative, RVP + for rhinovirus.     Copd exacerbation   due to Entero/Rhinovirus (RapRVP): Detected (10.07.21 @ 17:10) cont nebs and steroids,   patient is improving   Attending note: Pt was on o2 this am, exam showed mild wheeze bl, pt completed prednisone 40 gm x 5 days, will lindsay to 20 mg daily for 5 days as pt may need longer course   - continue bronchodilators     # positive troponin,   troponin trending down  no chest pain   there is a concern that the patient had an acs event and troponin now are trending down, however the patient had an miryam that could cause troponemia   cadio onboard and were called for f/u  f/u echo cardio  DW Cardio recc laith scan    # HTN,  controlled, cont meds  # DLD, controlled, cont meds  # hypothyroidism, controlled cont meds,   # schizophrenia stable cont meds    her place of stay might not be able to accept her due to her medical problems now

## 2021-10-12 NOTE — PROGRESS NOTE ADULT - SUBJECTIVE AND OBJECTIVE BOX
SUBJECTIVE:    Patient is a 66y old Female who presents with a chief complaint of SOB (11 Oct 2021 20:06)    Currently admitted to medicine with the primary diagnosis of NSTEMI (non-ST elevation myocardial infarction)       Today is hospital day 4d. This morning she is resting comfortably in bed and reports no new issues or overnight events.     PAST MEDICAL & SURGICAL HISTORY    SOCIAL HISTORY:  Negative for smoking/alcohol/drug use.     ALLERGIES:  Dilantin (Unknown)  Haldol (Unknown)    MEDICATIONS:  STANDING MEDICATIONS  albuterol/ipratropium for Nebulization 3 milliLiter(s) Nebulizer every 4 hours  amLODIPine   Tablet 5 milliGRAM(s) Oral daily  aspirin  chewable 81 milliGRAM(s) Oral daily  atorvastatin 40 milliGRAM(s) Oral at bedtime  benztropine 2 milliGRAM(s) Oral two times a day  budesonide  80 MICROgram(s)/formoterol 4.5 MICROgram(s) Inhaler 2 Puff(s) Inhalation two times a day  cholecalciferol 1000 Unit(s) Oral daily  diVALproex  milliGRAM(s) Oral two times a day  diVALproex  milliGRAM(s) Oral at bedtime  furosemide    Tablet 20 milliGRAM(s) Oral daily  heparin   Injectable 5000 Unit(s) SubCutaneous every 8 hours  hydrochlorothiazide 25 milliGRAM(s) Oral daily  hydrOXYzine hydrochloride 25 milliGRAM(s) Oral <User Schedule>  levothyroxine 88 MICROGram(s) Oral daily  lisinopril 40 milliGRAM(s) Oral daily  lurasidone 160 milliGRAM(s) Oral daily  pantoprazole  Injectable 40 milliGRAM(s) IV Push at bedtime  propranolol 10 milliGRAM(s) Oral daily  regadenoson Injectable 0.4 milliGRAM(s) IV Push once    PRN MEDICATIONS    VITALS:   T(F): 97.5  HR: 55  BP: 119/58  RR: 18  SpO2: 100%    LABS:                        13.3   6.70  )-----------( 182      ( 11 Oct 2021 09:07 )             40.5     10-11    144  |  100  |  15  ----------------------------<  100<H>  4.6   |  30  |  1.0    Ca    9.5      11 Oct 2021 09:07  Phos  2.9     10-11  Mg     2.0     10-11    TPro  6.4  /  Alb  4.1  /  TBili  0.3  /  DBili  x   /  AST  24  /  ALT  19  /  AlkPhos  56  10-11              CARDIAC MARKERS ( 11 Oct 2021 00:28 )  x     / 0.02 ng/mL / x     / x     / x          RADIOLOGY:    PHYSICAL EXAM:  GEN: No acute distress  LUNGS: Clear to auscultation bilaterally   HEART: S1/S2 present. RRR.   ABD: Soft, non-tender, non-distended. Bowel sounds present  EXT: NC/NC/NE/2+PP/JACKSON/Skin Intact.   NEURO: AAOX3    Intravenous access:   NG tube:   Wilson Catheter:          SUBJECTIVE:    Patient is a 66y old Female who presents with a chief complaint of SOB (11 Oct 2021 20:06)    Currently admitted to medicine with the primary diagnosis of NSTEMI (non-ST elevation myocardial infarction)       Today is hospital day 4d. This morning she is resting comfortably in bed and reports no new issues or overnight events.     Attending Note: Pt seen and examined at bedside. No cp or sob.     PAST MEDICAL & SURGICAL HISTORY    SOCIAL HISTORY:  Negative for smoking/alcohol/drug use.     ALLERGIES:  Dilantin (Unknown)  Haldol (Unknown)    MEDICATIONS:  STANDING MEDICATIONS  albuterol/ipratropium for Nebulization 3 milliLiter(s) Nebulizer every 4 hours  amLODIPine   Tablet 5 milliGRAM(s) Oral daily  aspirin  chewable 81 milliGRAM(s) Oral daily  atorvastatin 40 milliGRAM(s) Oral at bedtime  benztropine 2 milliGRAM(s) Oral two times a day  budesonide  80 MICROgram(s)/formoterol 4.5 MICROgram(s) Inhaler 2 Puff(s) Inhalation two times a day  cholecalciferol 1000 Unit(s) Oral daily  diVALproex  milliGRAM(s) Oral two times a day  diVALproex  milliGRAM(s) Oral at bedtime  furosemide    Tablet 20 milliGRAM(s) Oral daily  heparin   Injectable 5000 Unit(s) SubCutaneous every 8 hours  hydrochlorothiazide 25 milliGRAM(s) Oral daily  hydrOXYzine hydrochloride 25 milliGRAM(s) Oral <User Schedule>  levothyroxine 88 MICROGram(s) Oral daily  lisinopril 40 milliGRAM(s) Oral daily  lurasidone 160 milliGRAM(s) Oral daily  pantoprazole  Injectable 40 milliGRAM(s) IV Push at bedtime  propranolol 10 milliGRAM(s) Oral daily  regadenoson Injectable 0.4 milliGRAM(s) IV Push once    PRN MEDICATIONS    VITALS:   T(F): 97.5  HR: 55  BP: 119/58  RR: 18  SpO2: 100%    LABS:                        13.3   6.70  )-----------( 182      ( 11 Oct 2021 09:07 )             40.5     10-11    144  |  100  |  15  ----------------------------<  100<H>  4.6   |  30  |  1.0    Ca    9.5      11 Oct 2021 09:07  Phos  2.9     10-11  Mg     2.0     10-11    TPro  6.4  /  Alb  4.1  /  TBili  0.3  /  DBili  x   /  AST  24  /  ALT  19  /  AlkPhos  56  10-11              CARDIAC MARKERS ( 11 Oct 2021 00:28 )  x     / 0.02 ng/mL / x     / x     / x          RADIOLOGY:    PHYSICAL EXAM:  GEN: No acute distress  LUNGS: mild wheeze   HEART: S1/S2 present. RRR.   ABD: Soft, non-tender, non-distended. Bowel sounds present  EXT: NC/NC/NE/2+PP/JACKSON/Skin Intact.   NEURO: AAOX3    Intravenous access:   NG tube:   Wilson Catheter:

## 2021-10-13 LAB
ALBUMIN SERPL ELPH-MCNC: 3.7 G/DL — SIGNIFICANT CHANGE UP (ref 3.5–5.2)
ALP SERPL-CCNC: 47 U/L — SIGNIFICANT CHANGE UP (ref 30–115)
ALT FLD-CCNC: 19 U/L — SIGNIFICANT CHANGE UP (ref 0–41)
ANION GAP SERPL CALC-SCNC: 16 MMOL/L — HIGH (ref 7–14)
AST SERPL-CCNC: 20 U/L — SIGNIFICANT CHANGE UP (ref 0–41)
BILIRUB SERPL-MCNC: 0.3 MG/DL — SIGNIFICANT CHANGE UP (ref 0.2–1.2)
BUN SERPL-MCNC: 34 MG/DL — HIGH (ref 10–20)
CALCIUM SERPL-MCNC: 9.4 MG/DL — SIGNIFICANT CHANGE UP (ref 8.5–10.1)
CHLORIDE SERPL-SCNC: 102 MMOL/L — SIGNIFICANT CHANGE UP (ref 98–110)
CO2 SERPL-SCNC: 25 MMOL/L — SIGNIFICANT CHANGE UP (ref 17–32)
CREAT SERPL-MCNC: 1.3 MG/DL — SIGNIFICANT CHANGE UP (ref 0.7–1.5)
GLUCOSE SERPL-MCNC: 94 MG/DL — SIGNIFICANT CHANGE UP (ref 70–99)
HCT VFR BLD CALC: 38.7 % — SIGNIFICANT CHANGE UP (ref 37–47)
HGB BLD-MCNC: 12.9 G/DL — SIGNIFICANT CHANGE UP (ref 12–16)
MCHC RBC-ENTMCNC: 32.2 PG — HIGH (ref 27–31)
MCHC RBC-ENTMCNC: 33.3 G/DL — SIGNIFICANT CHANGE UP (ref 32–37)
MCV RBC AUTO: 96.5 FL — SIGNIFICANT CHANGE UP (ref 81–99)
NRBC # BLD: 0 /100 WBCS — SIGNIFICANT CHANGE UP (ref 0–0)
PLATELET # BLD AUTO: 185 K/UL — SIGNIFICANT CHANGE UP (ref 130–400)
POTASSIUM SERPL-MCNC: 3.6 MMOL/L — SIGNIFICANT CHANGE UP (ref 3.5–5)
POTASSIUM SERPL-SCNC: 3.6 MMOL/L — SIGNIFICANT CHANGE UP (ref 3.5–5)
PROT SERPL-MCNC: 5.9 G/DL — LOW (ref 6–8)
RBC # BLD: 4.01 M/UL — LOW (ref 4.2–5.4)
RBC # FLD: 13.6 % — SIGNIFICANT CHANGE UP (ref 11.5–14.5)
SODIUM SERPL-SCNC: 143 MMOL/L — SIGNIFICANT CHANGE UP (ref 135–146)
WBC # BLD: 7.55 K/UL — SIGNIFICANT CHANGE UP (ref 4.8–10.8)
WBC # FLD AUTO: 7.55 K/UL — SIGNIFICANT CHANGE UP (ref 4.8–10.8)

## 2021-10-13 PROCEDURE — 93018 CV STRESS TEST I&R ONLY: CPT

## 2021-10-13 PROCEDURE — 99233 SBSQ HOSP IP/OBS HIGH 50: CPT

## 2021-10-13 PROCEDURE — 78452 HT MUSCLE IMAGE SPECT MULT: CPT | Mod: 26

## 2021-10-13 PROCEDURE — 93016 CV STRESS TEST SUPVJ ONLY: CPT

## 2021-10-13 RX ORDER — INFLUENZA VIRUS VACCINE 15; 15; 15; 15 UG/.5ML; UG/.5ML; UG/.5ML; UG/.5ML
0.5 SUSPENSION INTRAMUSCULAR ONCE
Refills: 0 | Status: DISCONTINUED | OUTPATIENT
Start: 2021-10-13 | End: 2021-10-14

## 2021-10-13 RX ADMIN — DIVALPROEX SODIUM 500 MILLIGRAM(S): 500 TABLET, DELAYED RELEASE ORAL at 17:08

## 2021-10-13 RX ADMIN — Medication 25 MILLIGRAM(S): at 05:39

## 2021-10-13 RX ADMIN — Medication 2 MILLIGRAM(S): at 05:39

## 2021-10-13 RX ADMIN — ATORVASTATIN CALCIUM 40 MILLIGRAM(S): 80 TABLET, FILM COATED ORAL at 21:31

## 2021-10-13 RX ADMIN — LISINOPRIL 40 MILLIGRAM(S): 2.5 TABLET ORAL at 05:38

## 2021-10-13 RX ADMIN — DIVALPROEX SODIUM 500 MILLIGRAM(S): 500 TABLET, DELAYED RELEASE ORAL at 05:38

## 2021-10-13 RX ADMIN — Medication 3 MILLILITER(S): at 13:59

## 2021-10-13 RX ADMIN — BUDESONIDE AND FORMOTEROL FUMARATE DIHYDRATE 2 PUFF(S): 160; 4.5 AEROSOL RESPIRATORY (INHALATION) at 12:03

## 2021-10-13 RX ADMIN — AMLODIPINE BESYLATE 5 MILLIGRAM(S): 2.5 TABLET ORAL at 05:39

## 2021-10-13 RX ADMIN — Medication 2 MILLIGRAM(S): at 17:08

## 2021-10-13 RX ADMIN — HEPARIN SODIUM 5000 UNIT(S): 5000 INJECTION INTRAVENOUS; SUBCUTANEOUS at 21:32

## 2021-10-13 RX ADMIN — Medication 3 MILLILITER(S): at 17:59

## 2021-10-13 RX ADMIN — HEPARIN SODIUM 5000 UNIT(S): 5000 INJECTION INTRAVENOUS; SUBCUTANEOUS at 13:03

## 2021-10-13 RX ADMIN — DIVALPROEX SODIUM 250 MILLIGRAM(S): 500 TABLET, DELAYED RELEASE ORAL at 21:31

## 2021-10-13 RX ADMIN — Medication 88 MICROGRAM(S): at 05:39

## 2021-10-13 RX ADMIN — Medication 1000 UNIT(S): at 13:03

## 2021-10-13 RX ADMIN — LURASIDONE HYDROCHLORIDE 160 MILLIGRAM(S): 40 TABLET ORAL at 17:07

## 2021-10-13 RX ADMIN — BUDESONIDE AND FORMOTEROL FUMARATE DIHYDRATE 2 PUFF(S): 160; 4.5 AEROSOL RESPIRATORY (INHALATION) at 20:17

## 2021-10-13 RX ADMIN — HEPARIN SODIUM 5000 UNIT(S): 5000 INJECTION INTRAVENOUS; SUBCUTANEOUS at 05:40

## 2021-10-13 RX ADMIN — Medication 25 MILLIGRAM(S): at 21:31

## 2021-10-13 RX ADMIN — Medication 20 MILLIGRAM(S): at 05:38

## 2021-10-13 RX ADMIN — Medication 81 MILLIGRAM(S): at 13:03

## 2021-10-13 RX ADMIN — PANTOPRAZOLE SODIUM 40 MILLIGRAM(S): 20 TABLET, DELAYED RELEASE ORAL at 21:29

## 2021-10-13 NOTE — PROGRESS NOTE ADULT - SUBJECTIVE AND OBJECTIVE BOX
SUBJECTIVE:    Patient is a 66y old Female who presents with a chief complaint of SOB (12 Oct 2021 09:03)    Currently admitted to medicine with the primary diagnosis of NSTEMI (non-ST elevation myocardial infarction)       Today is hospital day 5d. This morning she is resting comfortably in bed and reports no new issues or overnight events.     PAST MEDICAL & SURGICAL HISTORY    SOCIAL HISTORY:  Negative for smoking/alcohol/drug use.     ALLERGIES:  Dilantin (Unknown)  Haldol (Unknown)    MEDICATIONS:  STANDING MEDICATIONS  albuterol/ipratropium for Nebulization 3 milliLiter(s) Nebulizer every 4 hours  amLODIPine   Tablet 5 milliGRAM(s) Oral daily  aspirin  chewable 81 milliGRAM(s) Oral daily  atorvastatin 40 milliGRAM(s) Oral at bedtime  benztropine 2 milliGRAM(s) Oral two times a day  budesonide  80 MICROgram(s)/formoterol 4.5 MICROgram(s) Inhaler 2 Puff(s) Inhalation two times a day  cholecalciferol 1000 Unit(s) Oral daily  diVALproex  milliGRAM(s) Oral two times a day  diVALproex  milliGRAM(s) Oral at bedtime  furosemide    Tablet 20 milliGRAM(s) Oral daily  heparin   Injectable 5000 Unit(s) SubCutaneous every 8 hours  hydrochlorothiazide 25 milliGRAM(s) Oral daily  hydrOXYzine hydrochloride 25 milliGRAM(s) Oral <User Schedule>  levothyroxine 88 MICROGram(s) Oral daily  lisinopril 40 milliGRAM(s) Oral daily  lurasidone 160 milliGRAM(s) Oral daily  pantoprazole  Injectable 40 milliGRAM(s) IV Push at bedtime  predniSONE   Tablet 20 milliGRAM(s) Oral daily  propranolol 10 milliGRAM(s) Oral daily  regadenoson Injectable 0.4 milliGRAM(s) IV Push once    PRN MEDICATIONS    VITALS:   T(F): 96.5  HR: 53  BP: 125/59  RR: 18  SpO2: 96%    LABS:                        12.9   7.55  )-----------( 185      ( 13 Oct 2021 04:30 )             38.7     10-13    143  |  102  |  34<H>  ----------------------------<  94  3.6   |  25  |  1.3    Ca    9.4      13 Oct 2021 04:30    TPro  5.9<L>  /  Alb  3.7  /  TBili  0.3  /  DBili  x   /  AST  20  /  ALT  19  /  AlkPhos  47  10-13          Troponin T, Serum: <0.01 ng/mL (10-12-21 @ 13:18)      CARDIAC MARKERS ( 12 Oct 2021 13:18 )  x     / <0.01 ng/mL / x     / x     / x          RADIOLOGY:    PHYSICAL EXAM:  GEN: No acute distress  LUNGS: Clear to auscultation bilaterally   HEART: S1/S2 present. RRR.   ABD: Soft, non-tender, non-distended. Bowel sounds present  EXT: NC/NC/NE/2+PP/JACKSON/Skin Intact.   NEURO: not answering questions     Intravenous access:   NG tube:   Wilson Catheter:

## 2021-10-13 NOTE — PROGRESS NOTE ADULT - ATTENDING COMMENTS
#Elevated cardiac enzymes  now resolved, suspect due to infection  ekg largely unremarkable  tte  nst neg  f/u cards  discharge planning, may be unable to return to assisted living facility  #COPD exacerbation due to enterovirus  cta neg  prednisone taper  duoneb q4  symbicort  no hypoxia, satting well on ra  #Adrenal myolipoma  incidentally noted on ct  f/u vma, renin: aldosterone  outpt endo f/u      #Progress Note Handoff:  Pending (specify):  Consults_________, Tests________, Test Results_______, Other___d/c planning______  Family discussion: d/w pt at bedside re: treatment plan, primary dx  Disposition: Home___/SNF___/Other________/Unknown at this time___x_____
#Progress Note Handoff  Pending (specify):  lexiscan, PT, placement   Family discussion: house staff updated pt family  Disposition: f9
#Progress Note Handoff  Pending (specify):   follow up cardiology and laith scan   Family discussion: house staff updated pt family  Disposition: f9

## 2021-10-13 NOTE — PROGRESS NOTE ADULT - ASSESSMENT
Patient is a 65 yo F w/ PMHx of HTN, DLD, hypothyroidism, COPD and schizophrenia was sent in for worsening shortness of breath. Patient has been experiencing weakness and fatigue for the past couple of days, also notes having cough w/ productive sputum. Denies fever/chills, HA, CP, palpitations, orthopnea, PND or lower extremity edema, recent sick contacts. On admission VS stable, WBC nl, Trop 0.26,  COVID was negative, RVP + for rhinovirus.     Copd exacerbation   due to Entero/Rhinovirus (RapRVP): Detected (10.07.21 @ 17:10) cont nebs and steroids,   patient is improving   - continue bronchodilators   cont steroids for more 2 days     # positive troponin,   troponin trending down  no chest pain   there is a concern that the patient had an acs event and troponin now are trending down, however the patient had an miryam that could cause troponemia   cadio onboard and were called for f/u  f/u echo cardio  DW Cardio recc laith scan    # HTN,  controlled, cont meds  # DLD, controlled, cont meds  # hypothyroidism, controlled cont meds,   # schizophrenia stable cont meds    her place of stay might not be able to accept her due to her medical problems now

## 2021-10-14 ENCOUNTER — TRANSCRIPTION ENCOUNTER (OUTPATIENT)
Age: 66
End: 2021-10-14

## 2021-10-14 VITALS
DIASTOLIC BLOOD PRESSURE: 50 MMHG | SYSTOLIC BLOOD PRESSURE: 94 MMHG | TEMPERATURE: 98 F | RESPIRATION RATE: 18 BRPM | HEART RATE: 50 BPM

## 2021-10-14 LAB
ALBUMIN SERPL ELPH-MCNC: 3.7 G/DL — SIGNIFICANT CHANGE UP (ref 3.5–5.2)
ALP SERPL-CCNC: 49 U/L — SIGNIFICANT CHANGE UP (ref 30–115)
ALT FLD-CCNC: 19 U/L — SIGNIFICANT CHANGE UP (ref 0–41)
ANION GAP SERPL CALC-SCNC: 14 MMOL/L — SIGNIFICANT CHANGE UP (ref 7–14)
AST SERPL-CCNC: 21 U/L — SIGNIFICANT CHANGE UP (ref 0–41)
BILIRUB SERPL-MCNC: 0.3 MG/DL — SIGNIFICANT CHANGE UP (ref 0.2–1.2)
BUN SERPL-MCNC: 38 MG/DL — HIGH (ref 10–20)
CALCIUM SERPL-MCNC: 9.4 MG/DL — SIGNIFICANT CHANGE UP (ref 8.5–10.1)
CHLORIDE SERPL-SCNC: 100 MMOL/L — SIGNIFICANT CHANGE UP (ref 98–110)
CO2 SERPL-SCNC: 28 MMOL/L — SIGNIFICANT CHANGE UP (ref 17–32)
CREAT SERPL-MCNC: 1.4 MG/DL — SIGNIFICANT CHANGE UP (ref 0.7–1.5)
GLUCOSE SERPL-MCNC: 93 MG/DL — SIGNIFICANT CHANGE UP (ref 70–99)
HCT VFR BLD CALC: 40.2 % — SIGNIFICANT CHANGE UP (ref 37–47)
HGB BLD-MCNC: 13.1 G/DL — SIGNIFICANT CHANGE UP (ref 12–16)
MCHC RBC-ENTMCNC: 31.9 PG — HIGH (ref 27–31)
MCHC RBC-ENTMCNC: 32.6 G/DL — SIGNIFICANT CHANGE UP (ref 32–37)
MCV RBC AUTO: 97.8 FL — SIGNIFICANT CHANGE UP (ref 81–99)
NRBC # BLD: 0 /100 WBCS — SIGNIFICANT CHANGE UP (ref 0–0)
PLATELET # BLD AUTO: 151 K/UL — SIGNIFICANT CHANGE UP (ref 130–400)
POTASSIUM SERPL-MCNC: 4.5 MMOL/L — SIGNIFICANT CHANGE UP (ref 3.5–5)
POTASSIUM SERPL-SCNC: 4.5 MMOL/L — SIGNIFICANT CHANGE UP (ref 3.5–5)
PROT SERPL-MCNC: 5.8 G/DL — LOW (ref 6–8)
RBC # BLD: 4.11 M/UL — LOW (ref 4.2–5.4)
RBC # FLD: 13.6 % — SIGNIFICANT CHANGE UP (ref 11.5–14.5)
SODIUM SERPL-SCNC: 142 MMOL/L — SIGNIFICANT CHANGE UP (ref 135–146)
WBC # BLD: 7.19 K/UL — SIGNIFICANT CHANGE UP (ref 4.8–10.8)
WBC # FLD AUTO: 7.19 K/UL — SIGNIFICANT CHANGE UP (ref 4.8–10.8)

## 2021-10-14 PROCEDURE — 99233 SBSQ HOSP IP/OBS HIGH 50: CPT

## 2021-10-14 PROCEDURE — 99239 HOSP IP/OBS DSCHRG MGMT >30: CPT

## 2021-10-14 RX ORDER — HYDROXYZINE HCL 10 MG
1 TABLET ORAL
Qty: 0 | Refills: 0 | DISCHARGE
Start: 2021-10-14

## 2021-10-14 RX ORDER — LURASIDONE HYDROCHLORIDE 40 MG/1
2 TABLET ORAL
Qty: 0 | Refills: 0 | DISCHARGE
Start: 2021-10-14

## 2021-10-14 RX ORDER — LEVOTHYROXINE SODIUM 125 MCG
1 TABLET ORAL
Qty: 0 | Refills: 0 | DISCHARGE
Start: 2021-10-14

## 2021-10-14 RX ORDER — BENZTROPINE MESYLATE 1 MG
1 TABLET ORAL
Qty: 0 | Refills: 0 | DISCHARGE
Start: 2021-10-14

## 2021-10-14 RX ORDER — FUROSEMIDE 40 MG
1 TABLET ORAL
Qty: 0 | Refills: 0 | DISCHARGE
Start: 2021-10-14

## 2021-10-14 RX ORDER — PROPRANOLOL HCL 160 MG
1 CAPSULE, EXTENDED RELEASE 24HR ORAL
Qty: 0 | Refills: 0 | DISCHARGE
Start: 2021-10-14

## 2021-10-14 RX ORDER — DIVALPROEX SODIUM 500 MG/1
1 TABLET, DELAYED RELEASE ORAL
Qty: 0 | Refills: 0 | DISCHARGE
Start: 2021-10-14

## 2021-10-14 RX ORDER — ALBUTEROL 90 UG/1
2 AEROSOL, METERED ORAL
Qty: 2 | Refills: 2
Start: 2021-10-14 | End: 2022-01-11

## 2021-10-14 RX ORDER — AMLODIPINE BESYLATE 2.5 MG/1
1 TABLET ORAL
Qty: 0 | Refills: 0 | DISCHARGE
Start: 2021-10-14

## 2021-10-14 RX ORDER — CHOLECALCIFEROL (VITAMIN D3) 125 MCG
1000 CAPSULE ORAL
Qty: 0 | Refills: 0 | DISCHARGE
Start: 2021-10-14

## 2021-10-14 RX ORDER — LISINOPRIL 2.5 MG/1
1 TABLET ORAL
Qty: 0 | Refills: 0 | DISCHARGE
Start: 2021-10-14

## 2021-10-14 RX ORDER — FLUPHENAZINE HYDROCHLORIDE 1 MG/1
37.5 TABLET, FILM COATED ORAL
Qty: 6 | Refills: 3
Start: 2021-10-14 | End: 2022-06-10

## 2021-10-14 RX ADMIN — LURASIDONE HYDROCHLORIDE 160 MILLIGRAM(S): 40 TABLET ORAL at 11:48

## 2021-10-14 RX ADMIN — Medication 1000 UNIT(S): at 11:48

## 2021-10-14 RX ADMIN — DIVALPROEX SODIUM 500 MILLIGRAM(S): 500 TABLET, DELAYED RELEASE ORAL at 17:01

## 2021-10-14 RX ADMIN — Medication 2 MILLIGRAM(S): at 17:01

## 2021-10-14 RX ADMIN — Medication 25 MILLIGRAM(S): at 05:50

## 2021-10-14 RX ADMIN — HEPARIN SODIUM 5000 UNIT(S): 5000 INJECTION INTRAVENOUS; SUBCUTANEOUS at 17:02

## 2021-10-14 RX ADMIN — Medication 2 MILLIGRAM(S): at 05:53

## 2021-10-14 RX ADMIN — AMLODIPINE BESYLATE 5 MILLIGRAM(S): 2.5 TABLET ORAL at 05:51

## 2021-10-14 RX ADMIN — HEPARIN SODIUM 5000 UNIT(S): 5000 INJECTION INTRAVENOUS; SUBCUTANEOUS at 05:53

## 2021-10-14 RX ADMIN — Medication 88 MICROGRAM(S): at 05:52

## 2021-10-14 RX ADMIN — BUDESONIDE AND FORMOTEROL FUMARATE DIHYDRATE 2 PUFF(S): 160; 4.5 AEROSOL RESPIRATORY (INHALATION) at 09:52

## 2021-10-14 RX ADMIN — Medication 20 MILLIGRAM(S): at 05:52

## 2021-10-14 RX ADMIN — DIVALPROEX SODIUM 500 MILLIGRAM(S): 500 TABLET, DELAYED RELEASE ORAL at 05:51

## 2021-10-14 RX ADMIN — Medication 81 MILLIGRAM(S): at 11:48

## 2021-10-14 RX ADMIN — Medication 25 MILLIGRAM(S): at 05:51

## 2021-10-14 RX ADMIN — Medication 3 MILLILITER(S): at 13:59

## 2021-10-14 RX ADMIN — LISINOPRIL 40 MILLIGRAM(S): 2.5 TABLET ORAL at 05:53

## 2021-10-14 RX ADMIN — Medication 20 MILLIGRAM(S): at 05:50

## 2021-10-14 NOTE — DISCHARGE NOTE PROVIDER - HOSPITAL COURSE
Patient is a 67 yo F w/ PMHx of HTN, DLD, hypothyroidism, COPD and schizophrenia was sent in for worsening shortness of breath. Patient has been experiencing weakness and fatigue for the past couple of days, also notes having cough w/ productive sputum. Denies fever/chills, HA, CP, palpitations, orthopnea, PND or lower extremity edema, recent sick contacts. On admission VS stable, WBC nl, Trop 0.26,  COVID was negative, RVP + for rhinovirus.     Copd exacerbation   due to Entero/Rhinovirus (RapRVP): Detected (10.07.21 @ 17:10) cont nebs and steroids,   patient is improving   was on steroids     # positive troponin,   troponin trending down  no chest pain   there is a concern that the patient had an acs event and troponin now are trending down, however the patient had an miryam that could cause troponemia   cadio onboard and were called for f/u  laith scan was normal        Patient is a 67 yo F w/ PMHx of HTN, DLD, hypothyroidism, COPD and schizophrenia was sent in for worsening shortness of breath. Patient has been experiencing weakness and fatigue for the past couple of days, also notes having cough w/ productive sputum. Denies fever/chills, HA, CP, palpitations, orthopnea, PND or lower extremity edema, recent sick contacts. On admission VS stable, WBC nl, Trop 0.26,  COVID was negative, RVP + for rhinovirus.     Copd exacerbation   due to Entero/Rhinovirus (RapRVP): Detected (10.07.21 @ 17:10) cont nebs and steroids,   patient is improving   was on steroids     # positive troponin,   troponin trending down  no chest pain   there is a concern that the patient had an acs event and troponin now are trending down, however the patient had an miryam that could cause troponemia   cadio onboard and were called for f/u  laith scan was normal     35 minutes spent on discharge planning.

## 2021-10-14 NOTE — DISCHARGE NOTE PROVIDER - NSDCCPCAREPLAN_GEN_ALL_CORE_FT
Normal
PRINCIPAL DISCHARGE DIAGNOSIS  Diagnosis: COPD exacerbation  Assessment and Plan of Treatment: You presented for shortness of breath. It is was due to exacerbation of the obstructive lung disease you have caused by a virus. You were given steroids and inhalers and you improved. There was a concern that you might have a heart attach. However stress imaging done showed normal heart function and no evidence of a heart attack. Please continue your medications as prescribed      SECONDARY DISCHARGE DIAGNOSES  Diagnosis: Adrenal incidentaloma  Assessment and Plan of Treatment: On imaging you were found to have an adrenal incidenteloma that is usually benign in origin. Sometime those tumors secrete hormones. Your incidenteloma showed no evidence of secretion till now, however there are still some pending tests. Please follow up with the endocrinologist

## 2021-10-14 NOTE — PROGRESS NOTE ADULT - ASSESSMENT
66F PMHx HTN, hypothyroidism, COPD, schizophrenia, HLD here with shortness of breath, found to have enterovirus infection. Elevated cardiac enzymes.    #Elevated cardiac enzymes  now resolved, suspect due to infection  ekg largely unremarkable  tte  nst neg  appreciate cards  stable for d/c, needs outpt pmd, cards f/u one week  #COPD exacerbation due to enterovirus  cta neg  prednisone taper  duoneb q4  symbicort  no hypoxia, satting well on ra  #Adrenal myolipoma  incidentally noted on ct  f/u vma, renin: aldosterone  outpt endo f/u  #HTN  norvasc 5  hctz 25  lasix 20  lisinopril 40  #Hypothyroidism  synthroid 88  #Schizophrenia  depkaote 500/ 500/ 250  lurasidone 160  #HLD  lipitor 40  #DVT ppx  subq hep

## 2021-10-14 NOTE — PROGRESS NOTE ADULT - SUBJECTIVE AND OBJECTIVE BOX
INTERVAL HPI/OVERNIGHT EVENTS:    SUBJECTIVE: Patient seen and examined at bedside.     no cp, sob, abd pain, fever  no cp, palpitaitons, duggan, orthopnea    OBJECTIVE:    VITAL SIGNS:  Vital Signs Last 24 Hrs  T(C): 36.2 (14 Oct 2021 05:00), Max: 36.8 (13 Oct 2021 13:32)  T(F): 97.2 (14 Oct 2021 05:00), Max: 98.3 (13 Oct 2021 13:32)  HR: 59 (14 Oct 2021 05:00) (59 - 68)  BP: 110/57 (14 Oct 2021 05:00) (110/57 - 130/64)  BP(mean): --  RR: 18 (14 Oct 2021 05:00) (14 - 18)  SpO2: --      PHYSICAL EXAM:    General: NAD  HEENT: NC/AT; PERRL, clear conjunctiva  Neck: supple  Respiratory: CTA b/l  Cardiovascular: +S1/S2; RRR  Abdomen: soft, NT/ND; +BS x4  Extremities: WWP, 2+ peripheral pulses b/l; no LE edema  Skin: normal color and turgor; no rash  Neurological:    MEDICATIONS:  MEDICATIONS  (STANDING):  albuterol/ipratropium for Nebulization 3 milliLiter(s) Nebulizer every 4 hours  amLODIPine   Tablet 5 milliGRAM(s) Oral daily  aspirin  chewable 81 milliGRAM(s) Oral daily  atorvastatin 40 milliGRAM(s) Oral at bedtime  benztropine 2 milliGRAM(s) Oral two times a day  budesonide  80 MICROgram(s)/formoterol 4.5 MICROgram(s) Inhaler 2 Puff(s) Inhalation two times a day  cholecalciferol 1000 Unit(s) Oral daily  diVALproex  milliGRAM(s) Oral two times a day  diVALproex  milliGRAM(s) Oral at bedtime  furosemide    Tablet 20 milliGRAM(s) Oral daily  heparin   Injectable 5000 Unit(s) SubCutaneous every 8 hours  hydrochlorothiazide 25 milliGRAM(s) Oral daily  hydrOXYzine hydrochloride 25 milliGRAM(s) Oral <User Schedule>  influenza   Vaccine 0.5 milliLiter(s) IntraMuscular once  levothyroxine 88 MICROGram(s) Oral daily  lisinopril 40 milliGRAM(s) Oral daily  lurasidone 160 milliGRAM(s) Oral daily  pantoprazole  Injectable 40 milliGRAM(s) IV Push at bedtime  predniSONE   Tablet 20 milliGRAM(s) Oral daily  propranolol 10 milliGRAM(s) Oral daily  regadenoson Injectable 0.4 milliGRAM(s) IV Push once    MEDICATIONS  (PRN):      ALLERGIES:  Allergies    Dilantin (Unknown)  Haldol (Unknown)    Intolerances        LABS:                        13.1   7.19  )-----------( 151      ( 14 Oct 2021 04:30 )             40.2     Hemoglobin: 13.1 g/dL (10-14 @ 04:30)  Hemoglobin: 12.9 g/dL (10-13 @ 04:30)  Hemoglobin: 13.3 g/dL (10-11 @ 09:07)    CBC Full  -  ( 14 Oct 2021 04:30 )  WBC Count : 7.19 K/uL  RBC Count : 4.11 M/uL  Hemoglobin : 13.1 g/dL  Hematocrit : 40.2 %  Platelet Count - Automated : 151 K/uL  Mean Cell Volume : 97.8 fL  Mean Cell Hemoglobin : 31.9 pg  Mean Cell Hemoglobin Concentration : 32.6 g/dL  Auto Neutrophil # : x  Auto Lymphocyte # : x  Auto Monocyte # : x  Auto Eosinophil # : x  Auto Basophil # : x  Auto Neutrophil % : x  Auto Lymphocyte % : x  Auto Monocyte % : x  Auto Eosinophil % : x  Auto Basophil % : x    10-14    142  |  100  |  38<H>  ----------------------------<  93  4.5   |  28  |  1.4    Ca    9.4      14 Oct 2021 04:30    TPro  5.8<L>  /  Alb  3.7  /  TBili  0.3  /  DBili  x   /  AST  21  /  ALT  19  /  AlkPhos  49  10-14    Creatinine Trend: 1.4<--, 1.3<--, 1.0<--, 1.1<--, 0.9<--, 1.4<--  LIVER FUNCTIONS - ( 14 Oct 2021 04:30 )  Alb: 3.7 g/dL / Pro: 5.8 g/dL / ALK PHOS: 49 U/L / ALT: 19 U/L / AST: 21 U/L / GGT: x               hs Troponin:              CSF:                      EKG:   MICROBIOLOGY:    IMAGING:      Labs, imaging, EKG personally reviewed    RADIOLOGY & ADDITIONAL TESTS: Reviewed.

## 2021-10-14 NOTE — PROGRESS NOTE ADULT - PROVIDER SPECIALTY LIST ADULT
Hospitalist
Hospitalist
Internal Medicine
Cardiology
Endocrinology
Internal Medicine

## 2021-10-14 NOTE — DISCHARGE NOTE PROVIDER - NSDCMRMEDTOKEN_GEN_ALL_CORE_FT
albuterol 90 mcg/inh inhalation powder: 2 puff(s) inhaled every 6 hours   amLODIPine 5 mg oral tablet: 1 tab(s) orally once a day  benztropine 2 mg oral tablet: 1 tab(s) orally 2 times a day  cholecalciferol oral tablet: 1000 unit(s) orally once a day  divalproex sodium 250 mg oral delayed release tablet: 1 tab(s) orally once a day (at bedtime)  divalproex sodium 500 mg oral delayed release tablet: 1 tab(s) orally 2 times a day  fluPHENAZine decanoate 25 mg/mL injectable solution: 37.5 milligram(s) intramuscularly every 2 weeks   furosemide 20 mg oral tablet: 1 tab(s) orally once a day  hydroCHLOROthiazide 25 mg oral tablet: 1 tab(s) orally once a day  hydrOXYzine hydrochloride 25 mg oral tablet: 1 tab(s) orally   levothyroxine 88 mcg (0.088 mg) oral tablet: 1 tab(s) orally once a day  lisinopril 40 mg oral tablet: 1 tab(s) orally once a day  lurasidone 80 mg oral tablet: 2 tab(s) orally once a day  propranolol 10 mg oral tablet: 1 tab(s) orally once a day

## 2021-10-14 NOTE — PHYSICAL THERAPY INITIAL EVALUATION ADULT - TINETTI GAIT TEST, REHAB EVAL
Physician Discharge Summary     Patient ID:  Katie Foreman  [id number]  40 y.o.  1941    Admit date: 9/19/2020    Discharge date and time:  9/26/2020    Discharge Diagnoses: Principal Problem:    Acute cholecystitis  Active Problems:    HLD (hyperlipidemia)    HTN (hypertension)    Hypothyroidism    History of prostate cancer    Leukocytosis    History of colon cancer    S/P colostomy (HCC)    CAD (coronary artery disease)    Acute coronary syndrome ([address]  Resolved Problems:    * No resolved hospital problems.  *      Consults: IP CONSULT TO GENERAL SURGERY  IP CONSULT TO CARDIOLOGY  IP CONSULT TO SOCIAL WORK  IP CONSULT TO CASE MANAGEMENT  IP CONSULT TO CARDIAC REHAB    Procedures: See below    Hospital Course: 66-year-old male history hypertension hyperlipidemia admitted with acute cholecystitis and ACS status post cholecystostomy tube 9/22 and left heart cath with PCI and MIRELA to mid LAD 9/25     Continue antibiotics Augmentin  DAPT  Statin  Cardiology consult appreciated  General surgery consult appreciated  Medications for other co morbidities cont as appropriate w dosage adjustments as necessary   PT/OT  DVT PPx  DC planning home with home health care    Discharge Exam:  See progress note from today    Condition:  Stable    Disposition: home    Patient Instructions:   Current Discharge Medication List      START taking these medications    Details   aspirin 81 MG chewable tablet Take 1 tablet by mouth daily  Qty: 30 tablet, Refills: 3      metoprolol succinate (TOPROL XL) 50 MG extended release tablet Take 1 tablet by mouth daily  Qty: 30 tablet, Refills: 11      amLODIPine (NORVASC) 5 MG tablet Take 1 tablet by mouth daily  Qty: 30 tablet, Refills: 11      clopidogrel (PLAVIX) 75 MG tablet Take 1 tablet by mouth daily  Qty: 30 tablet, Refills: 11      amoxicillin-clavulanate (AUGMENTIN) 875-125 MG per tablet Take 1 tablet by mouth every 12 hours for 8 days  Qty: 16 tablet, Refills: 0         CONTINUE
12/28 fall risk

## 2021-10-14 NOTE — PHYSICAL THERAPY INITIAL EVALUATION ADULT - PERTINENT HX OF CURRENT PROBLEM, REHAB EVAL
Patient is a 66y old Female who presents with a chief complaint of SOB (12 Oct 2021 09:03), found to have NSTEMI

## 2021-10-14 NOTE — DISCHARGE NOTE NURSING/CASE MANAGEMENT/SOCIAL WORK - PATIENT PORTAL LINK FT
You can access the FollowMyHealth Patient Portal offered by [facility] by registering at the following website: [email or web address]. By joining [employer]’s FollowMyHealth portal, you will also be able to view your health information using other applications (apps) compatible with our system.

## 2021-10-14 NOTE — PHYSICAL THERAPY INITIAL EVALUATION ADULT - GENERAL OBSERVATIONS, REHAB EVAL
10:45-11:15 pt encountered in bed , easily aroused, oxygen via NC, tele. Pt tolerated bed mobility and transfers, able to ambulate a few steps, then sat down with c/o fatigue.  Continue PT as tolerated.

## 2021-10-14 NOTE — PROGRESS NOTE ADULT - ASSESSMENT
#incidental right side 3.1 cm adrenal myelolipoma   - adrenal myolipoma usually are benign / no hormone hypersecretion   - plasma metanephrineand renin sent by primary team pending , patient already on steroid no screen for [place] now   - need outpatient follow up with endocrinology to follow up hormonal work up and possible repeat CT scan in 6 months - 1 year to evaluate for growth   -  can be followed in [facility]/ endocrinology  clinic [phone]    #hypothyroidism   TSH ok , continue Lt4 88 mcg daily

## 2021-10-14 NOTE — DISCHARGE NOTE PROVIDER - CARE PROVIDER_API CALL
Chel Sanabria)  Medicine  [address] [place] [address]  Phone: [phone]  Fax: [phone]  Follow Up Time: 2 weeks

## 2021-10-14 NOTE — PROGRESS NOTE ADULT - SUBJECTIVE AND OBJECTIVE BOX
S: patient seen this pm , plan to be discharged today   O: Vital Signs Last 24 Hrs  T(C): 36.4 (14 Oct 2021 13:30), Max: 36.4 (14 Oct 2021 13:30)  T(F): 97.6 (14 Oct 2021 13:30), Max: 97.6 (14 Oct 2021 13:30)  HR: 50 (14 Oct 2021 13:30) (50 - 63)  BP: 94/50 (14 Oct 2021 13:30) (94/50 - 120/60)  BP(mean): --  RR: 18 (14 Oct 2021 13:30) (14 - 18)  SpO2: --      no new PE findings     MEDICATIONS  (STANDING):  albuterol/ipratropium for Nebulization 3 milliLiter(s) Nebulizer every 4 hours  amLODIPine   Tablet 5 milliGRAM(s) Oral daily  aspirin  chewable 81 milliGRAM(s) Oral daily  atorvastatin 40 milliGRAM(s) Oral at bedtime  benztropine 2 milliGRAM(s) Oral two times a day  budesonide  80 MICROgram(s)/formoterol 4.5 MICROgram(s) Inhaler 2 Puff(s) Inhalation two times a day  cholecalciferol 1000 Unit(s) Oral daily  diVALproex  milliGRAM(s) Oral two times a day  diVALproex  milliGRAM(s) Oral at bedtime  furosemide    Tablet 20 milliGRAM(s) Oral daily  heparin   Injectable 5000 Unit(s) SubCutaneous every 8 hours  hydrochlorothiazide 25 milliGRAM(s) Oral daily  hydrOXYzine hydrochloride 25 milliGRAM(s) Oral <User Schedule>  influenza   Vaccine 0.5 milliLiter(s) IntraMuscular once  levothyroxine 88 MICROGram(s) Oral daily  lisinopril 40 milliGRAM(s) Oral daily  lurasidone 160 milliGRAM(s) Oral daily  pantoprazole  Injectable 40 milliGRAM(s) IV Push at bedtime  predniSONE   Tablet 20 milliGRAM(s) Oral daily  propranolol 10 milliGRAM(s) Oral daily  regadenoson Injectable 0.4 milliGRAM(s) IV Push once    MEDICATIONS  (PRN):      Aldosterone, Serum: 6.9: V

## 2021-10-14 NOTE — DISCHARGE NOTE PROVIDER - NSDCCAREPROVSEEN_GEN_ALL_CORE_FT
dr isidro breo not on formulary  - c/w fluticasone  - c/w albuterol prn - c/w fluticasone  - c/w albuterol prn

## 2021-10-18 LAB — RENIN PLAS-CCNC: 0.21 NG/ML/HR — SIGNIFICANT CHANGE UP (ref 0.17–5.38)

## 2021-10-20 DIAGNOSIS — J44.0 CHRONIC OBSTRUCTIVE PULMONARY DISEASE WITH (ACUTE) LOWER RESPIRATORY INFECTION: ICD-10-CM

## 2021-10-20 DIAGNOSIS — I25.10 ATHEROSCLEROTIC HEART DISEASE OF NATIVE CORONARY ARTERY WITHOUT ANGINA PECTORIS: ICD-10-CM

## 2021-10-20 DIAGNOSIS — Z88.8 ALLERGY STATUS TO OTHER DRUGS, MEDICAMENTS AND BIOLOGICAL SUBSTANCES STATUS: ICD-10-CM

## 2021-10-20 DIAGNOSIS — D17.79 BENIGN LIPOMATOUS NEOPLASM OF OTHER SITES: ICD-10-CM

## 2021-10-20 DIAGNOSIS — E03.9 HYPOTHYROIDISM, UNSPECIFIED: ICD-10-CM

## 2021-10-20 DIAGNOSIS — J06.9 ACUTE UPPER RESPIRATORY INFECTION, UNSPECIFIED: ICD-10-CM

## 2021-10-20 DIAGNOSIS — B97.89 OTHER VIRAL AGENTS AS THE CAUSE OF DISEASES CLASSIFIED ELSEWHERE: ICD-10-CM

## 2021-10-20 DIAGNOSIS — J44.1 CHRONIC OBSTRUCTIVE PULMONARY DISEASE WITH (ACUTE) EXACERBATION: ICD-10-CM

## 2021-10-20 DIAGNOSIS — F17.200 NICOTINE DEPENDENCE, UNSPECIFIED, UNCOMPLICATED: ICD-10-CM

## 2021-10-20 DIAGNOSIS — I10 ESSENTIAL (PRIMARY) HYPERTENSION: ICD-10-CM

## 2021-10-20 DIAGNOSIS — F25.9 SCHIZOAFFECTIVE DISORDER, UNSPECIFIED: ICD-10-CM

## 2021-10-20 DIAGNOSIS — N17.9 ACUTE KIDNEY FAILURE, UNSPECIFIED: ICD-10-CM

## 2021-10-20 DIAGNOSIS — R06.02 SHORTNESS OF BREATH: ICD-10-CM

## 2021-10-20 DIAGNOSIS — I21.A1 MYOCARDIAL INFARCTION TYPE 2: ICD-10-CM

## 2021-10-20 DIAGNOSIS — F32.9 MAJOR DEPRESSIVE DISORDER, SINGLE EPISODE, UNSPECIFIED: ICD-10-CM

## 2021-10-20 DIAGNOSIS — Z79.890 HORMONE REPLACEMENT THERAPY: ICD-10-CM

## 2021-10-20 DIAGNOSIS — J22 UNSPECIFIED ACUTE LOWER RESPIRATORY INFECTION: ICD-10-CM

## 2022-10-03 PROBLEM — Z00.00 ENCOUNTER FOR PREVENTIVE HEALTH EXAMINATION: Status: ACTIVE | Noted: 2022-10-03

## 2022-10-04 ENCOUNTER — OUTPATIENT (OUTPATIENT)
Dept: OUTPATIENT SERVICES | Facility: HOSPITAL | Age: 67
LOS: 1 days | Discharge: HOME | End: 2022-10-04

## 2022-10-04 DIAGNOSIS — Z12.31 ENCOUNTER FOR SCREENING MAMMOGRAM FOR MALIGNANT NEOPLASM OF BREAST: ICD-10-CM

## 2022-10-04 PROCEDURE — 77067 SCR MAMMO BI INCL CAD: CPT | Mod: 26

## 2022-10-04 PROCEDURE — 77063 BREAST TOMOSYNTHESIS BI: CPT | Mod: 26

## 2022-10-06 ENCOUNTER — APPOINTMENT (OUTPATIENT)
Dept: GASTROENTEROLOGY | Facility: CLINIC | Age: 67
End: 2022-10-06

## 2022-12-16 ENCOUNTER — APPOINTMENT (OUTPATIENT)
Dept: GASTROENTEROLOGY | Facility: CLINIC | Age: 67
End: 2022-12-16
Payer: MEDICARE

## 2022-12-16 VITALS
OXYGEN SATURATION: 98 % | DIASTOLIC BLOOD PRESSURE: 101 MMHG | HEART RATE: 68 BPM | SYSTOLIC BLOOD PRESSURE: 167 MMHG | HEIGHT: 62 IN | WEIGHT: 203.6 LBS | BODY MASS INDEX: 37.47 KG/M2

## 2022-12-16 DIAGNOSIS — Z80.0 FAMILY HISTORY OF MALIGNANT NEOPLASM OF DIGESTIVE ORGANS: ICD-10-CM

## 2022-12-16 DIAGNOSIS — Z87.891 PERSONAL HISTORY OF NICOTINE DEPENDENCE: ICD-10-CM

## 2022-12-16 DIAGNOSIS — Z78.9 OTHER SPECIFIED HEALTH STATUS: ICD-10-CM

## 2022-12-16 PROCEDURE — 99203 OFFICE O/P NEW LOW 30 MIN: CPT

## 2022-12-16 RX ORDER — ALBUTEROL SULFATE 2.5 MG/3ML
(2.5 MG/3ML) SOLUTION RESPIRATORY (INHALATION)
Refills: 0 | Status: ACTIVE | COMMUNITY

## 2022-12-16 RX ORDER — PREDNISOLONE ACETATE 10 MG/ML
1 SUSPENSION/ DROPS OPHTHALMIC
Refills: 0 | Status: ACTIVE | COMMUNITY

## 2022-12-16 RX ORDER — ALBUTEROL SULFATE 108 UG/1
108 (90 BASE) AEROSOL, METERED RESPIRATORY (INHALATION)
Refills: 0 | Status: ACTIVE | COMMUNITY

## 2022-12-16 RX ORDER — ELECTROLYTES/DEXTROSE
10 SOLUTION, ORAL ORAL
Refills: 0 | Status: ACTIVE | COMMUNITY

## 2022-12-16 RX ORDER — HYDROCORTISONE 1 %
12 CREAM (GRAM) TOPICAL
Refills: 0 | Status: ACTIVE | COMMUNITY

## 2022-12-16 RX ORDER — POLYETHYLENE GLYCOL 3350 AND ELECTROLYTES WITH LEMON FLAVOR 236; 22.74; 6.74; 5.86; 2.97 G/4L; G/4L; G/4L; G/4L; G/4L
236 POWDER, FOR SOLUTION ORAL
Qty: 1 | Refills: 0 | Status: ACTIVE | COMMUNITY
Start: 2022-12-16 | End: 1900-01-01

## 2022-12-16 RX ORDER — CALCIUM CARBONATE 500(1250)
500 TABLET ORAL
Refills: 0 | Status: ACTIVE | COMMUNITY

## 2022-12-16 RX ORDER — DOCUSATE SODIUM 100 MG/1
100 CAPSULE, LIQUID FILLED ORAL
Refills: 0 | Status: ACTIVE | COMMUNITY

## 2022-12-16 NOTE — PHYSICAL EXAM
[Alert] : alert [Normal Voice/Communication] : normal voice/communication [No Acute Distress] : no acute distress [Obese (BMI >= 30)] : obese (BMI >= 30) [Sclera] : the sclera and conjunctiva were normal [None] : no edema [Normal] : normal bowel sounds, non-tender, no masses, soft, no no hepato-splenomegaly [Abnormal Walk] : normal gait [Normal Color / Pigmentation] : normal skin color and pigmentation [Oriented To Time, Place, And Person] : oriented to person, place, and time [No CVA Tenderness] : no CVA  tenderness

## 2022-12-20 ENCOUNTER — APPOINTMENT (OUTPATIENT)
Age: 67
End: 2022-12-20

## 2022-12-20 DIAGNOSIS — Z86.010 ENCOUNTER FOR SCREENING FOR MALIGNANT NEOPLASM OF COLON: ICD-10-CM

## 2022-12-20 DIAGNOSIS — Z12.11 ENCOUNTER FOR SCREENING FOR MALIGNANT NEOPLASM OF COLON: ICD-10-CM

## 2022-12-20 PROCEDURE — 99205 OFFICE O/P NEW HI 60 MIN: CPT

## 2023-01-06 NOTE — HISTORY OF PRESENT ILLNESS
[FreeTextEntry1] : 67 yr old female with CAD (not on any anticoagulants), HLD,  COPD, HTN, Hypothyroidism, Schizophrenia, and  Obesity sent from the [facility] referred here for a surveillance colonoscopy due to her past H/O colonic polyps about 10 years ago. She denied abdominal pain, heartburn, dysphagia, vomiting, diarrhea, melena, recent fevers, or unintentional weight loss. She stated that she has a H/O constipation but it has resolved with taking stool softeners; she reported having 1-2 BMs a day. She stated that her maternal grandmother had stomach cancer but she denied a family H/O colon cancer. \par \par \par \par \par \par \par \par \par \par \par \par \par \par \par \par \par \par \par \par \par \par \par \par \par \par \par \par \par \par \par \par \par \par \par \par \par \par \par \par \par \par \par \par \par \par \par \par \par \par \par \par \par \par \par \par \par \par \par \par \par \par \par \par \par \par \par \par \par \par \par \par \par \par \par \par \par \par \par \par \par \par \par \par \par \par \par \par \par \par \par \par \par \par \par \par \par \par \par \par \par \par \par \par \par \par \par \par \par \par \par \par \par \par \par \par \par \par \par \par \par \par \par \par \par \par \par \par \par \par \par \par \par \par \par \par \par \par \par

## 2023-01-06 NOTE — ASSESSMENT
[FreeTextEntry1] : 67 yr old female with CAD (not on any anticoagulants), HLD,  COPD, HTN, Hypothyroidism, Schizophrenia, and  Obesity sent from the [facility] referred here for a surveillance colonoscopy due to her past H/O colonic polyps about 10 years ago.\par \par H/O Colonic polyps about 10 yrs ago (report not available)\par \par - Patient unsure if she had a colonoscopy at [facility] 1-2 years ago (no reports in [facility] system)\par - CBC, BMP, and TFTs from 12/9/22 reviewed\par - Cardiology Note from 10/11/21 reviewed; underlying CAD suspected (she was to continue her meds, no stent or cardiac interventions indicated at that time)\par - Will schedule a surveillance Colonoscopy; all risks and benefits of the procedure were explained to the patient today\par - Golytely and Dulcolax Prep were sent to her pharmacy today\par - Patient's BP noted to be high today: 167/101; patient told that her BP needs to be well controlled prior to the procedure\par - F/U in the office after the procedure is done

## 2023-01-06 NOTE — REASON FOR VISIT
[Consultation] : a consultation visit [Formal Caregiver] : formal caregiver [FreeTextEntry1] : NP - Colon Consult

## 2023-01-06 NOTE — REVIEW OF SYSTEMS
[Negative] : Heme/Lymph [As Noted in HPI] : as noted in HPI [Abdominal Pain] : no abdominal pain [Vomiting] : no vomiting [Constipation] : no constipation [Diarrhea] : no diarrhea [Heartburn] : no heartburn [Melena (black stool)] : no melena [Bleeding] : no bleeding [Fecal Incontinence (soiling)] : no fecal incontinence [Bloating (gassiness)] : no bloating [FreeTextEntry7] : H/O constipation but better with Colace daily; has 1-2 BMs daily

## 2023-01-09 ENCOUNTER — APPOINTMENT (OUTPATIENT)
Age: 68
End: 2023-01-09
Payer: MEDICARE

## 2023-01-09 VITALS
DIASTOLIC BLOOD PRESSURE: 84 MMHG | HEART RATE: 77 BPM | HEIGHT: 62 IN | OXYGEN SATURATION: 96 % | SYSTOLIC BLOOD PRESSURE: 138 MMHG | BODY MASS INDEX: 38.09 KG/M2 | WEIGHT: 207 LBS

## 2023-01-09 PROCEDURE — 71046 X-RAY EXAM CHEST 2 VIEWS: CPT

## 2023-01-09 PROCEDURE — 99215 OFFICE O/P EST HI 40 MIN: CPT | Mod: 25

## 2023-04-03 ENCOUNTER — RESULT CHARGE (OUTPATIENT)
Age: 68
End: 2023-04-03

## 2023-04-03 ENCOUNTER — APPOINTMENT (OUTPATIENT)
Dept: CARDIOLOGY | Facility: CLINIC | Age: 68
End: 2023-04-03
Payer: MEDICARE

## 2023-04-03 VITALS
WEIGHT: 210 LBS | HEART RATE: 68 BPM | SYSTOLIC BLOOD PRESSURE: 150 MMHG | BODY MASS INDEX: 38.64 KG/M2 | HEIGHT: 62 IN | DIASTOLIC BLOOD PRESSURE: 80 MMHG

## 2023-04-03 DIAGNOSIS — R94.31 ABNORMAL ELECTROCARDIOGRAM [ECG] [EKG]: ICD-10-CM

## 2023-04-03 DIAGNOSIS — E03.9 HYPOTHYROIDISM, UNSPECIFIED: ICD-10-CM

## 2023-04-03 PROCEDURE — 93000 ELECTROCARDIOGRAM COMPLETE: CPT

## 2023-04-03 PROCEDURE — 99214 OFFICE O/P EST MOD 30 MIN: CPT

## 2023-04-03 RX ORDER — ATORVASTATIN CALCIUM 40 MG/1
40 TABLET, FILM COATED ORAL DAILY
Refills: 0 | Status: ACTIVE | COMMUNITY

## 2023-04-03 RX ORDER — AMANTADINE HYDROCHLORIDE 100 MG/1
100 CAPSULE ORAL TWICE DAILY
Refills: 0 | Status: ACTIVE | COMMUNITY

## 2023-04-03 RX ORDER — PROPRANOLOL HYDROCHLORIDE 10 MG/1
10 TABLET ORAL DAILY
Refills: 0 | Status: ACTIVE | COMMUNITY

## 2023-04-03 RX ORDER — GABAPENTIN 400 MG/1
400 CAPSULE ORAL
Refills: 0 | Status: ACTIVE | COMMUNITY

## 2023-04-03 RX ORDER — HYDROCHLOROTHIAZIDE 25 MG/1
25 TABLET ORAL DAILY
Refills: 0 | Status: DISCONTINUED | COMMUNITY
End: 2023-04-03

## 2023-04-03 RX ORDER — LISINOPRIL 40 MG/1
40 TABLET ORAL DAILY
Refills: 0 | Status: ACTIVE | COMMUNITY

## 2023-04-03 RX ORDER — FLUPHENAZINE DECANOATE 25 MG/ML
25 INJECTION, SOLUTION INTRAMUSCULAR; SUBCUTANEOUS
Qty: 5 | Refills: 0 | Status: ACTIVE | COMMUNITY
Start: 2022-11-15

## 2023-04-03 RX ORDER — CHLORTHALIDONE 50 MG/1
50 TABLET ORAL DAILY
Qty: 30 | Refills: 5 | Status: ACTIVE | COMMUNITY
Start: 2023-04-03 | End: 1900-01-01

## 2023-04-03 RX ORDER — AMLODIPINE BESYLATE 10 MG/1
10 TABLET ORAL DAILY
Refills: 0 | Status: ACTIVE | COMMUNITY

## 2023-04-03 RX ORDER — LEVOTHYROXINE SODIUM 88 UG/1
88 TABLET ORAL DAILY
Refills: 0 | Status: ACTIVE | COMMUNITY

## 2023-04-03 RX ORDER — LEVETIRACETAM 1000 MG/1
1000 TABLET, FILM COATED ORAL TWICE DAILY
Refills: 0 | Status: ACTIVE | COMMUNITY

## 2023-04-03 RX ORDER — FUROSEMIDE 20 MG/1
20 TABLET ORAL DAILY
Refills: 0 | Status: DISCONTINUED | COMMUNITY
End: 2023-04-03

## 2023-04-03 NOTE — REVIEW OF SYSTEMS
[Weight Gain (___ Lbs)] : [unfilled] ~Ulb weight gain [Dyspnea on exertion] : dyspnea during exertion [Blurry Vision] : no blurred vision [Chest Discomfort] : no chest discomfort [Lower Ext Edema] : lower extremity edema [Leg Claudication] : no intermittent leg claudication [Palpitations] : no palpitations [Anxiety] : no anxiety [Negative] : Neurological

## 2023-04-03 NOTE — PHYSICAL EXAM
[Obese] : obese [Normal S1, S2] : normal S1, S2 [Clear Lung Fields] : clear lung fields [No Edema] : no edema [Normal PT B/L] : normal PT B/L [Normal DP B/L] : normal DP B/L [Normal] : moves all extremities, no focal deficits, normal speech [Cognitive Impairment] : cognitive impairment

## 2023-04-03 NOTE — ASSESSMENT
[FreeTextEntry1] : 67 y.o. female with PMH of CAD, HTN, HLD.\par HTN poorly controlled.\par CHERY, leg edema.\par \par Plan:\par - will stop Furosemide and HCTZ.\par - will start Chlorthalidone 50 mg daily.\par - continue Lisinopril.\par - continue Amlodipine for now, will try to reduce once BP is better controlled.\par - 2D ECHO.\par - will obtain records from Dr. Sisi HAQ.\par \par F/U after testing.

## 2023-04-03 NOTE — HISTORY OF PRESENT ILLNESS
[FreeTextEntry1] : 67 y.o. female with PMH of seizures, HTN, MI, schizophrenia, COPD, presents to establish care. Patient complains of CHERY, leg edema. She has gained 20 lbs over 1 year.\par \par Denies chest pain, palpitations. Patient followed with Dr. Laird from [facility] in the past but decided to transfer her care here.

## 2023-04-10 ENCOUNTER — APPOINTMENT (OUTPATIENT)
Dept: PULMONOLOGY | Facility: CLINIC | Age: 68
End: 2023-04-10
Payer: MEDICARE

## 2023-04-10 VITALS
HEIGHT: 61 IN | WEIGHT: 212 LBS | OXYGEN SATURATION: 96 % | SYSTOLIC BLOOD PRESSURE: 136 MMHG | HEART RATE: 82 BPM | BODY MASS INDEX: 40.02 KG/M2 | DIASTOLIC BLOOD PRESSURE: 82 MMHG

## 2023-04-10 DIAGNOSIS — I25.10 ATHEROSCLEROTIC HEART DISEASE OF NATIVE CORONARY ARTERY W/OUT ANGINA PECTORIS: ICD-10-CM

## 2023-04-10 DIAGNOSIS — R06.09 OTHER FORMS OF DYSPNEA: ICD-10-CM

## 2023-04-10 DIAGNOSIS — K63.5 POLYP OF COLON: ICD-10-CM

## 2023-04-10 DIAGNOSIS — R41.81 AGE-RELATED COGNITIVE DECLINE: ICD-10-CM

## 2023-04-10 DIAGNOSIS — J44.9 CHRONIC OBSTRUCTIVE PULMONARY DISEASE, UNSPECIFIED: ICD-10-CM

## 2023-04-10 DIAGNOSIS — I10 ESSENTIAL (PRIMARY) HYPERTENSION: ICD-10-CM

## 2023-04-10 DIAGNOSIS — E78.5 HYPERLIPIDEMIA, UNSPECIFIED: ICD-10-CM

## 2023-04-10 PROCEDURE — 99213 OFFICE O/P EST LOW 20 MIN: CPT

## 2023-04-10 NOTE — DISCUSSION/SUMMARY
[FreeTextEntry1] : Patient comes in for follow-up complaints are about the same.  Only some mild dyspnea with exertion.  No chest pain.  Mild edema.  She is awake alert oriented asks many questions takes notes.  Chest is clear today.  CT scan shows a couple of nodules.  There is calcification of the aortic arch.  There is an adrenal myelo lipoma identified.\par \par At this point the plan would be to see her in 1 year for another CT of the chest to follow-up on the nodules refer to renal for an opinion about whether or not to remove the old Milo lipoma.  I believe this would be unlikely.  His pulmonary status is otherwise stable.  End of dictation.  Total time spent 30 minutes.  With aid here.

## 2023-05-15 ENCOUNTER — APPOINTMENT (OUTPATIENT)
Dept: GASTROENTEROLOGY | Facility: CLINIC | Age: 68
End: 2023-05-15

## 2023-06-01 ENCOUNTER — APPOINTMENT (OUTPATIENT)
Dept: CARDIOLOGY | Facility: CLINIC | Age: 68
End: 2023-06-01

## 2023-06-13 ENCOUNTER — APPOINTMENT (OUTPATIENT)
Dept: CARDIOLOGY | Facility: CLINIC | Age: 68
End: 2023-06-13

## 2023-07-17 ENCOUNTER — APPOINTMENT (OUTPATIENT)
Dept: CARDIOLOGY | Facility: CLINIC | Age: 68
End: 2023-07-17

## 2023-10-05 ENCOUNTER — OUTPATIENT (OUTPATIENT)
Dept: OUTPATIENT SERVICES | Facility: HOSPITAL | Age: 68
LOS: 1 days | End: 2023-10-05
Payer: MEDICARE

## 2023-10-05 DIAGNOSIS — Z12.31 ENCOUNTER FOR SCREENING MAMMOGRAM FOR MALIGNANT NEOPLASM OF BREAST: ICD-10-CM

## 2023-10-05 DIAGNOSIS — Z00.8 ENCOUNTER FOR OTHER GENERAL EXAMINATION: ICD-10-CM

## 2023-10-05 PROCEDURE — 77067 SCR MAMMO BI INCL CAD: CPT | Mod: 26

## 2023-10-05 PROCEDURE — 77063 BREAST TOMOSYNTHESIS BI: CPT

## 2023-10-05 PROCEDURE — 77067 SCR MAMMO BI INCL CAD: CPT

## 2023-10-05 PROCEDURE — 77063 BREAST TOMOSYNTHESIS BI: CPT | Mod: 26

## 2023-10-06 DIAGNOSIS — Z12.31 ENCOUNTER FOR SCREENING MAMMOGRAM FOR MALIGNANT NEOPLASM OF BREAST: ICD-10-CM

## 2023-11-13 ENCOUNTER — APPOINTMENT (OUTPATIENT)
Dept: GASTROENTEROLOGY | Facility: CLINIC | Age: 68
End: 2023-11-13

## 2024-05-23 ENCOUNTER — APPOINTMENT (OUTPATIENT)
Dept: OBGYN | Facility: CLINIC | Age: 69
End: 2024-05-23

## 2024-06-06 ENCOUNTER — APPOINTMENT (OUTPATIENT)
Dept: OPHTHALMOLOGY | Facility: CLINIC | Age: 69
End: 2024-06-06